# Patient Record
Sex: FEMALE | Race: WHITE | NOT HISPANIC OR LATINO | Employment: UNEMPLOYED | URBAN - METROPOLITAN AREA
[De-identification: names, ages, dates, MRNs, and addresses within clinical notes are randomized per-mention and may not be internally consistent; named-entity substitution may affect disease eponyms.]

---

## 2017-01-02 ENCOUNTER — HOSPITAL ENCOUNTER (EMERGENCY)
Facility: MEDICAL CENTER | Age: 27
End: 2017-01-02
Attending: EMERGENCY MEDICINE
Payer: COMMERCIAL

## 2017-01-02 VITALS
BODY MASS INDEX: 23.04 KG/M2 | OXYGEN SATURATION: 95 % | HEIGHT: 63 IN | TEMPERATURE: 97.3 F | HEART RATE: 70 BPM | DIASTOLIC BLOOD PRESSURE: 76 MMHG | SYSTOLIC BLOOD PRESSURE: 103 MMHG | RESPIRATION RATE: 16 BRPM | WEIGHT: 130 LBS

## 2017-01-02 DIAGNOSIS — F15.10 METHAMPHETAMINE ABUSE (HCC): ICD-10-CM

## 2017-01-02 LAB
ALBUMIN SERPL BCP-MCNC: 3.5 G/DL (ref 3.2–4.9)
ALBUMIN/GLOB SERPL: 1.3 G/DL
ALP SERPL-CCNC: 33 U/L (ref 30–99)
ALT SERPL-CCNC: 10 U/L (ref 2–50)
ANION GAP SERPL CALC-SCNC: 9 MMOL/L (ref 0–11.9)
AST SERPL-CCNC: 11 U/L (ref 12–45)
BASOPHILS # BLD AUTO: 0.5 % (ref 0–1.8)
BASOPHILS # BLD: 0.04 K/UL (ref 0–0.12)
BILIRUB SERPL-MCNC: 0.9 MG/DL (ref 0.1–1.5)
BUN SERPL-MCNC: 6 MG/DL (ref 8–22)
CALCIUM SERPL-MCNC: 8.9 MG/DL (ref 8.5–10.5)
CHLORIDE SERPL-SCNC: 107 MMOL/L (ref 96–112)
CO2 SERPL-SCNC: 21 MMOL/L (ref 20–33)
CREAT SERPL-MCNC: 0.69 MG/DL (ref 0.5–1.4)
EOSINOPHIL # BLD AUTO: 0.22 K/UL (ref 0–0.51)
EOSINOPHIL NFR BLD: 2.7 % (ref 0–6.9)
ERYTHROCYTE [DISTWIDTH] IN BLOOD BY AUTOMATED COUNT: 43.1 FL (ref 35.9–50)
GFR SERPL CREATININE-BSD FRML MDRD: >60 ML/MIN/1.73 M 2
GLOBULIN SER CALC-MCNC: 2.7 G/DL (ref 1.9–3.5)
GLUCOSE SERPL-MCNC: 75 MG/DL (ref 65–99)
HCT VFR BLD AUTO: 40.6 % (ref 37–47)
HGB BLD-MCNC: 12.9 G/DL (ref 12–16)
IMM GRANULOCYTES # BLD AUTO: 0.02 K/UL (ref 0–0.11)
IMM GRANULOCYTES NFR BLD AUTO: 0.2 % (ref 0–0.9)
LYMPHOCYTES # BLD AUTO: 1.84 K/UL (ref 1–4.8)
LYMPHOCYTES NFR BLD: 22.3 % (ref 22–41)
MCH RBC QN AUTO: 28.9 PG (ref 27–33)
MCHC RBC AUTO-ENTMCNC: 31.8 G/DL (ref 33.6–35)
MCV RBC AUTO: 91 FL (ref 81.4–97.8)
MONOCYTES # BLD AUTO: 0.44 K/UL (ref 0–0.85)
MONOCYTES NFR BLD AUTO: 5.3 % (ref 0–13.4)
NEUTROPHILS # BLD AUTO: 5.68 K/UL (ref 2–7.15)
NEUTROPHILS NFR BLD: 69 % (ref 44–72)
NRBC # BLD AUTO: 0 K/UL
NRBC BLD AUTO-RTO: 0 /100 WBC
PLATELET # BLD AUTO: 191 K/UL (ref 164–446)
PMV BLD AUTO: 9.8 FL (ref 9–12.9)
POTASSIUM SERPL-SCNC: 3.4 MMOL/L (ref 3.6–5.5)
PROT SERPL-MCNC: 6.2 G/DL (ref 6–8.2)
RBC # BLD AUTO: 4.46 M/UL (ref 4.2–5.4)
SODIUM SERPL-SCNC: 137 MMOL/L (ref 135–145)
TROPONIN I SERPL-MCNC: <0.01 NG/ML (ref 0–0.04)
WBC # BLD AUTO: 8.2 K/UL (ref 4.8–10.8)

## 2017-01-02 PROCEDURE — 36415 COLL VENOUS BLD VENIPUNCTURE: CPT

## 2017-01-02 PROCEDURE — 80053 COMPREHEN METABOLIC PANEL: CPT

## 2017-01-02 PROCEDURE — 99285 EMERGENCY DEPT VISIT HI MDM: CPT

## 2017-01-02 PROCEDURE — 84484 ASSAY OF TROPONIN QUANT: CPT

## 2017-01-02 PROCEDURE — 700111 HCHG RX REV CODE 636 W/ 250 OVERRIDE (IP): Performed by: EMERGENCY MEDICINE

## 2017-01-02 PROCEDURE — 96361 HYDRATE IV INFUSION ADD-ON: CPT

## 2017-01-02 PROCEDURE — 96374 THER/PROPH/DIAG INJ IV PUSH: CPT

## 2017-01-02 PROCEDURE — 85025 COMPLETE CBC W/AUTO DIFF WBC: CPT

## 2017-01-02 PROCEDURE — 700105 HCHG RX REV CODE 258: Performed by: EMERGENCY MEDICINE

## 2017-01-02 RX ORDER — LORAZEPAM 2 MG/ML
1 INJECTION INTRAMUSCULAR ONCE
Status: COMPLETED | OUTPATIENT
Start: 2017-01-02 | End: 2017-01-02

## 2017-01-02 RX ORDER — SODIUM CHLORIDE 9 MG/ML
1000 INJECTION, SOLUTION INTRAVENOUS ONCE
Status: COMPLETED | OUTPATIENT
Start: 2017-01-02 | End: 2017-01-02

## 2017-01-02 RX ADMIN — SODIUM CHLORIDE 1000 ML: 9 INJECTION, SOLUTION INTRAVENOUS at 20:14

## 2017-01-02 RX ADMIN — LORAZEPAM 1 MG: 2 INJECTION INTRAMUSCULAR; INTRAVENOUS at 20:14

## 2017-01-02 ASSESSMENT — PAIN SCALES - GENERAL: PAINLEVEL_OUTOF10: 6

## 2017-01-02 NOTE — ED AVS SNAPSHOT
After Visit Summary                                                                                                                Abel Angulo   MRN: [id number]    Department:  [facility], Emergency Dept   Date of Visit:  1/2/2017            [facility], Emergency Dept    [address] [place] [id number]    Phone:  [phone]      You were seen by     Marcus Hawkins M.D.      Your Diagnosis Was     Methamphetamine abuse     F15.10       These are the medications you received during your hospitalization from 01/02/2017 1933 to 01/02/2017 2312     Date/Time Order Dose Route Action    01/02/2017 2014 NS infusion 1,000 mL 1,000 mL Intravenous New Bag    01/02/2017 2014 lorazepam (ATIVAN) injection 1 mg 1 mg Intravenous Given      Follow-up Information     1. Follow up with [facility], Emergency Dept.    Specialty:  Emergency Medicine    Why:  If symptoms worsen    Contact information    [address] [place] [id number] [phone]      Medication Information     Review all of your home medications and newly ordered medications with your primary doctor and/or pharmacist as soon as possible. Follow medication instructions as directed by your doctor and/or pharmacist.     Please keep your complete medication list with you and share with your physician. Update the information when medications are discontinued, doses are changed, or new medications (including over-the-counter products) are added; and carry medication information at all times in the event of emergency situations.               Medication List      Notice     You have not been prescribed any medications.            Procedures and tests performed during your visit     CBC WITH DIFFERENTIAL    COMP METABOLIC PANEL    ESTIMATED GFR    IV Saline Lock    TROPONIN        Discharge Instructions       Amphetamine Abuse  Meth and other amphetamines over-stimulate the  nervous system. This gives you a false feeling of power and confidence. Amphetamines once came in the form of diet pills. This is no longer considered a valid reason to use the drug. More often they are bought as the illegal drug, methamphetamine. It is also known as crank, crystal, speed, or ice. Meth and similar drugs can cause a variety of problems. They can cause severe physical and psychological problems.  SYMPTOMS   · Reduced appetite.  · Dry mouth.  · Erectile dysfunction.  · Headache.  · Fever and sweating.  · Diarrhea or constipation.  · Blurred vision and impaired speech.  · Dizziness, uncontrollable movements or shaking.  · Restlessness.  · Palpitations and irregular heartbeat.  · Anxiety and/or general nervousness.  Long-term problems:  · Convulsions.  · Heart attack.  · Poor skin color.  · A mental state that mimics serious psychiatric illness.  · Emotional instability.  · Aggression.  · Dry or itchy skin.  · Acne.  RISKS AND COMPLICATIONS  Risks associated with needle use and inhaling include:  · Infection.  · Vein damage.  · Overdose.  · Skin abscesses.  · Hepatitis.  · AIDS.  TREATMENT   There are 2 types:   · Short-term medical treatment. This helps to preserve life and prevent or minimize damage from the problems described above.  · Long-term substance abuse treatment. This helps to achieve recovery from drug abuse or addiction.  HOME CARE INSTRUCTIONS   After treatment discharge, it is essential to do the following:  · Follow all instructions from your caregiver very carefully.  · Take all medications as prescribed. If you cannot, contact your caregiver right away.  · Keep all appointments for further evaluation and counseling.  · Do not use drugs, alcohol or any other mind and mood altering drugs unless prescribed by your doctor.  · Do not operate a motor vehicle or machinery until your caregiver says it is OK.  SEEK IMMEDIATE MEDICAL CARE IF:   · You have a seizure (convulsions).  · You become  very shaky or tense.  · You become light headed or faint.  · You notice sudden or gradual weakness on one side of the body or an arm or leg, or are unable to walk.  · You have a sudden, severe headache, blurred vision or high fever.  · You develop chest pain, nausea or vomiting.  If you have any of the above symptoms, DO NOT DRIVE. Have someone else drive you or call your local emergency services (911 in [place] for help.  Document Released: 01/25/2006 Document Revised: 03/11/2013 Document Reviewed: 03/15/2011  ExitCare® Patient Information ©2014 [employer].            Patient Information     Patient Information    Following emergency treatment: all patient requiring follow-up care must return either to a private physician or a clinic if your condition worsens before you are able to obtain further medical attention, please return to the emergency room.     Billing Information    At [facility], we work to make the billing process streamlined for our patients.  Our Representatives are here to answer any questions you may have regarding your hospital bill.  If you have insurance coverage and have supplied your insurance information to us, we will submit a claim to your insurer on your behalf.  Should you have any questions regarding your bill, we can be reached online or by phone as follows:  Online: You are able pay your bills online or live chat with our representatives about any billing questions you may have. We are here to help Monday - Friday from 8:00am to 7:30pm and 9:00am - 12:00pm on Saturdays.  Please visit [email or web address]  for more information.   Phone:  [phone] or [phone]    Please note that your emergency physician, surgeon, pathologist, radiologist, anesthesiologist, and other specialists are not employed by [facility] and will therefore bill separately for their services.  Please contact them directly for any questions concerning their bills at the  numbers below:     Emergency Physician Services:  [phone]  [facility]:  [phone]  [facility]:  [phone]  [facility]:  [phone]    1. Your final bill may vary from the amount quoted upon discharge if all procedures are not complete at that time, or if your doctor has additional procedures of which we are not aware. You will receive an additional bill if you return to the Emergency Department at [facility] for suture removal regardless of the facility of which the sutures were placed.     2. Please arrange for settlement of this account at the emergency registration.    3. All self-pay accounts are due in full at the time of treatment.  If you are unable to meet this obligation then payment is expected within 4-5 days.     4. If you have had radiology studies (CT, X-ray, Ultrasound, MRI), you have received a preliminary result during your emergency department visit. Please contact the radiology department [phone] to receive a copy of your final result. Please discuss the Final result with your primary physician or with the follow up physician provided.     Crisis Hotline:  [place] Crisis Hotline:  5-095-AYYUYJB or 1-368.897.1381  [place] Crisis Hotline:    1-472.968.2452 or 468-722-2000         ED Discharge Follow Up Questions    1. In order to provide you with very good care, we would like to follow up with a phone call in the next few days.  May we have your permission to contact you?     YES /  NO    2. What is the best phone number to call you? (       )_____-__________    3. What is the best time to call you?      Morning  /  Afternoon  /  Evening                   Patient Signature:  ____________________________________________________________    Date:  ____________________________________________________________

## 2017-01-02 NOTE — ED AVS SNAPSHOT
[employer] Access Code: [email or web address]  Expires: 2/1/2017 11:12 PM    [employer]  A secure, online tool to manage your health information     [employer]’s [employer]® is a secure, online tool that connects you to your personalized health information from the privacy of your home -- day or night - making it very easy for you to manage your healthcare. Once the activation process is completed, you can even access your medical information using the [employer] radha, which is available for free in the Apple Radha store or Google Play store.     [employer] provides the following levels of access (as shown below):   My Chart Features   [facility] Primary Care Doctor [facility]  Specialists [facility]  Urgent  Care Non-[facility]  Primary Care  Doctor   Email your healthcare team securely and privately 24/7 X X X X   Manage appointments: schedule your next appointment; view details of past/upcoming appointments X      Request prescription refills. X      View recent personal medical records, including lab and immunizations X X X X   View health record, including health history, allergies, medications X X X X   Read reports about your outpatient visits, procedures, consult and ER notes X X X X   See your discharge summary, which is a recap of your hospital and/or ER visit that includes your diagnosis, lab results, and care plan. X X       How to register for [employer]:  1. Go to  [email or web address].  2. Click on the Sign Up Now box, which takes you to the New Member Sign Up page. You will need to provide the following information:  a. Enter your [employer] Access Code exactly as it appears at the top of this page. (You will not need to use this code after you’ve completed the sign-up process. If you do not sign up before the expiration date, you must request a new code.)   b. Enter your date of birth.   c. Enter your home email address.   d. Click Submit, and follow the next screen’s instructions.  3. Create a [employer] ID. This will be your [employer]  login ID and cannot be changed, so think of one that is secure and easy to remember.  4. Create a [employer] password. You can change your password at any time.  5. Enter your Password Reset Question and Answer. This can be used at a later time if you forget your password.   6. Enter your e-mail address. This allows you to receive e-mail notifications when new information is available in [employer].  7. Click Sign Up. You can now view your health information.    For assistance activating your [employer] account, call [phone]

## 2017-01-02 NOTE — ED AVS SNAPSHOT
1/2/2017          Abel Rg Kev  [address]  [place] [place] [facility] [address]    Dear Abel:    [facility] wants to ensure your discharge home is safe and you or your loved ones have had all your questions answered regarding your care after you leave the hospital.    You may receive a telephone call within two days of your discharge.  This call is to make certain you understand your discharge instructions as well as ensure we provided you with the best care possible during your stay with us.     The call will only last approximately 3-5 minutes and will be done by a nurse.    Once again, we want to ensure your discharge home is safe and that you have a clear understanding of any next steps in your care.  If you have any questions or concerns, please do not hesitate to contact us, we are here for you.  Thank you for choosing [facility] for your healthcare needs.    Sincerely,    Truman Rich    [facility]

## 2017-01-03 NOTE — DISCHARGE PLANNING
Medical Social Work    Referral: Resources/Shelter    Intervention: MSW received a call from ERP requesting assistance with resources for pt's meth use and homelessness.  MSW met with pt at bedside.  Pt was provided with Alcohol and Drug Abuse resources and MSW recommended that she reach out to [facility] for assistance due to her insurance; pt reports understanding.  Pt states that she's homeless but knows where the shelter is.  Pt requested a bed at the shelter and MSW informed her that's unlikely due to the cold weather.  MSW contacted Laura with the [facility] who states that pt may stay in their day room at the tables for the night.  MSW updated bedside RN and pt.  Pt states that she will walk to the shelter from here.      Plan: Pt to D/C to the shelter.

## 2017-01-03 NOTE — ED PROVIDER NOTES
ER Provider Note     Scribed for Marcus Hawkins, * by Jeremy Lilly. 1/2/2017, 7:50 PM.    Primary Care Provider: SNEHA Lopez  Means of Arrival: Ambulance   History obtained from: Patient  History limited by: None     CHIEF COMPLAINT   Chief Complaint   Patient presents with   • Tremors     pt with jerking of arms and legs at [facility] today, co lightheadedness and dizziness prior to this, pt stops jerking movements to answer questions, then starts back, pt is aox4       HPI   Skymadisongiannialvin Ifrah Angulo is a 26 y.o. who presents to the emergency department by ambulance for tremors. Patient states she suddenly felt nauseated, dizzy, and lightheaded at [facility] earlier and had a bystander call EMS. Patient states vomiting prior to arrival. She reports to methamphetamine use the other day, and marijuana prior to arrival. Per patient, she has had congestion over past few days. Denies fever, chills, diarrhea or sore throat.      REVIEW OF SYSTEMS     General: Lightheadedness, dizzy. No fever or chills.  Eyes: No eye discharge. No eye pain.  Ear nose throat: No sore throat or  trouble swallowing. No runny nose or congestion.  Pulmonary: No shortness of breath or cough.  Cardiovascular: No chest pain or chest pressure.  GI: Nausea, vomiting. No abdominal pain   : No dysuria or hematuria  Dermatologic: No rashes. No abrasions.  Neurologic: No weakness or numbness.  Psychiatric: Positive stress for being homeless  All other systems reviewed and are negative    PAST MEDICAL HISTORY  Past Medical History   Diagnosis Date   • Dental disorder    • Migraine    • Psychiatric problem      depression   • Depression      anxiety   • Migraines    • Acid reflux        SURGICAL HISTORY  Past Surgical History   Procedure Laterality Date   • Other       left breast implant   • Esophagoscopy  4/5/2012     Performed by TULIO HOLLAND at SURGERY SAME DAY [facility]   • Gyn surgery       2 [facility]  "HISTORY  Social History   Substance Use Topics   • Smoking status: Current Some Day Smoker -- 0.00 packs/day for 2 years     Types: Cigarettes   • Smokeless tobacco: Never Used   • Alcohol Use: Yes       CURRENT MEDICATIONS  Previous Medications    No medications on file       ALLERGIES   Allergies   Allergen Reactions   • Benadryl Allergy Rash     Rash         PHYSICAL EXAM   Vital Signs: /76 mmHg  Pulse 86  Temp(Src) 36.3 °C (97.3 °F)  Resp 16  Ht 1.6 m (5' 3\")  Wt 58.968 kg (130 lb)  BMI 23.03 kg/m2  LMP 12/02/2016      Constitutional: Slightly disheveled, well appearing,  Psychiatric: Intermittent voluntary twitching.  HENT:  Oropharynx: no exudate no erythema  Eyes: PERRLA, EOMI, Conjunctiva normal, No discharge.   Musculoskeletal: Neck is soft and supple no meningismus  Lymphatic: No cervical lymphadenopathy noted.   Cardiovascular: Normal heart rate, Normal rhythm, No murmurs, Negative Homans, no pedal edema, good equal pedal pulses.  Pulmonary: Lungs are clear to auscultation bilaterally. No wheezes rales or rhonchi  Abdomen: Bowel sounds normal, soft nondistended and nontender. No rebound and no guarding .  Skin: Warm, Dry, No erythema, No rash.   : No CVA tenderness.   Neurologic: Alert & oriented, moves all extremities normally. Good sensation to light touch on all extremities.    DIAGNOSTIC STUDIES/PROCEDURES  Labs:   Results for orders placed or performed during the hospital encounter of 01/02/17   CBC WITH DIFFERENTIAL   Result Value Ref Range    WBC 8.2 4.8 - 10.8 K/uL    RBC 4.46 4.20 - 5.40 M/uL    Hemoglobin 12.9 12.0 - 16.0 g/dL    Hematocrit 40.6 37.0 - 47.0 %    MCV 91.0 81.4 - 97.8 fL    MCH 28.9 27.0 - 33.0 pg    MCHC 31.8 (L) 33.6 - 35.0 g/dL    RDW 43.1 35.9 - 50.0 fL    Platelet Count 191 164 - 446 K/uL    MPV 9.8 9.0 - 12.9 fL    Neutrophils-Polys 69.00 44.00 - 72.00 %    Lymphocytes 22.30 22.00 - 41.00 %    Monocytes 5.30 0.00 - 13.40 %    Eosinophils 2.70 0.00 - 6.90 %    " Basophils 0.50 0.00 - 1.80 %    Immature Granulocytes 0.20 0.00 - 0.90 %    Nucleated RBC 0.00 /100 WBC    Neutrophils (Absolute) 5.68 2.00 - 7.15 K/uL    Lymphs (Absolute) 1.84 1.00 - 4.80 K/uL    Monos (Absolute) 0.44 0.00 - 0.85 K/uL    Eos (Absolute) 0.22 0.00 - 0.51 K/uL    Baso (Absolute) 0.04 0.00 - 0.12 K/uL    Immature Granulocytes (abs) 0.02 0.00 - 0.11 K/uL    NRBC (Absolute) 0.00 K/uL   COMP METABOLIC PANEL   Result Value Ref Range    Sodium 137 135 - 145 mmol/L    Potassium 3.4 (L) 3.6 - 5.5 mmol/L    Chloride 107 96 - 112 mmol/L    Co2 21 20 - 33 mmol/L    Anion Gap 9.0 0.0 - 11.9    Glucose 75 65 - 99 mg/dL    Bun 6 (L) 8 - 22 mg/dL    Creatinine 0.69 0.50 - 1.40 mg/dL    Calcium 8.9 8.5 - 10.5 mg/dL    AST(SGOT) 11 (L) 12 - 45 U/L    ALT(SGPT) 10 2 - 50 U/L    Alkaline Phosphatase 33 30 - 99 U/L    Total Bilirubin 0.9 0.1 - 1.5 mg/dL    Albumin 3.5 3.2 - 4.9 g/dL    Total Protein 6.2 6.0 - 8.2 g/dL    Globulin 2.7 1.9 - 3.5 g/dL    A-G Ratio 1.3 g/dL   TROPONIN   Result Value Ref Range    Troponin I <0.01 0.00 - 0.04 ng/mL   ESTIMATED GFR   Result Value Ref Range    GFR If African American >60 >60 mL/min/1.73 m 2    GFR If Non African American >60 >60 mL/min/1.73 m 2     All labs reviewed by me.    COURSE & MEDICAL DECISION MAKING   Nursing notes, VS, PMSFSHx reviewed in chart     Reviewed previous medical records. Patient has multiple visits with similar complaints.     Differential Diagnoses: (include but are not limited to) methamphetamine use vs. Seizures vs URI.     7:50 PM - Patient was evaluated; estimated GFR, CBC with differential, CMP, Troponin ordered. The patient will be medicated with 1,000 mL NS, 1 mg Ativan injection for her symptoms.      10:47 PM - Recheck patient. Patient sleeping comfortably. Patient experienced slight tremor when woken. She was informed there is no evidence of abnormalities on lab results and will be discharged home. Patient states she is currently homeless, I  informed her she will consult with social work.    10:53 PM - Paged social work.     This patient is here to methamphetamine use. She appears well nontoxic no signs of any stroke or infectious etiology the patient be safely discharged home as per the shaking it's appears to be purposeful only when she wakes up and at this point is intermittent. The patient when given Ativan completely stopped was sleeping for several hours woke up and started having shaking as well. The patient has a seizure and there is 2nd malingering or 2nd issue here at this point the patient was found to be homeless at that time and therefore I suspect that this may be some secondary gain.    Patient will be discharged encouraged to follow-up with a primary care physician she also was given referral by social work.    The patient will return for new or worsening symptoms and is stable at the time of discharge.    DISPOSITION:  Patient will be discharged home in stable condition.    FOLLOW UP:  [facility], Emergency Dept  [address] [facility] [address] [phone]    If symptoms worsen      OUTPATIENT MEDICATIONS:  New Prescriptions    No medications on file     FINAL IMPRESSION   1. Methamphetamine abuse         Jeremy RUEDA (Remi), am scribing for, and in the presence of, Marcus Hawkins, *.    Electronically signed by: Jeremy Lilly (Remi), 1/2/2017    Marcus RUEDA, * personally performed the services described in this documentation, as scribed by Jeremy Lilly in my presence, and it is both accurate and complete.    The note accurately reflects work and decisions made by me.  Marcus Hawkins  1/3/2017  12:41 AM

## 2017-01-03 NOTE — ED NOTES
Pt having tremors to arms and legs until this nurse performed a venipuncture to lt hand, pt stopped having tremors to arms held arms still, legs continued to move occasionally, pt able to speak and follow commands during tremors, pt held guaze to venipuncture site until tape is placed then started having tremors to her arms again

## 2017-01-03 NOTE — ED NOTES
..  Chief Complaint   Patient presents with   • Tremors     pt with jerking of arms and legs at [facility] today, co lightheadedness and dizziness prior to this, pt stops jerking movements to answer questions, then starts back, pt is aox4

## 2017-01-03 NOTE — DISCHARGE INSTRUCTIONS
Amphetamine Abuse  Meth and other amphetamines over-stimulate the nervous system. This gives you a false feeling of power and confidence. Amphetamines once came in the form of diet pills. This is no longer considered a valid reason to use the drug. More often they are bought as the illegal drug, methamphetamine. It is also known as crank, crystal, speed, or ice. Meth and similar drugs can cause a variety of problems. They can cause severe physical and psychological problems.  SYMPTOMS   · Reduced appetite.  · Dry mouth.  · Erectile dysfunction.  · Headache.  · Fever and sweating.  · Diarrhea or constipation.  · Blurred vision and impaired speech.  · Dizziness, uncontrollable movements or shaking.  · Restlessness.  · Palpitations and irregular heartbeat.  · Anxiety and/or general nervousness.  Long-term problems:  · Convulsions.  · Heart attack.  · Poor skin color.  · A mental state that mimics serious psychiatric illness.  · Emotional instability.  · Aggression.  · Dry or itchy skin.  · Acne.  RISKS AND COMPLICATIONS  Risks associated with needle use and inhaling include:  · Infection.  · Vein damage.  · Overdose.  · Skin abscesses.  · Hepatitis.  · AIDS.  TREATMENT   There are 2 types:   · Short-term medical treatment. This helps to preserve life and prevent or minimize damage from the problems described above.  · Long-term substance abuse treatment. This helps to achieve recovery from drug abuse or addiction.  HOME CARE INSTRUCTIONS   After treatment discharge, it is essential to do the following:  · Follow all instructions from your caregiver very carefully.  · Take all medications as prescribed. If you cannot, contact your caregiver right away.  · Keep all appointments for further evaluation and counseling.  · Do not use drugs, alcohol or any other mind and mood altering drugs unless prescribed by your doctor.  · Do not operate a motor vehicle or machinery until your caregiver says it is OK.  SEEK IMMEDIATE  MEDICAL CARE IF:   · You have a seizure (convulsions).  · You become very shaky or tense.  · You become light headed or faint.  · You notice sudden or gradual weakness on one side of the body or an arm or leg, or are unable to walk.  · You have a sudden, severe headache, blurred vision or high fever.  · You develop chest pain, nausea or vomiting.  If you have any of the above symptoms, DO NOT DRIVE. Have someone else drive you or call your local emergency services (911 in [place] for help.  Document Released: 01/25/2006 Document Revised: 03/11/2013 Document Reviewed: 03/15/2011  [employer]® Patient Information ©2014 [employer].

## 2017-01-03 NOTE — ED NOTES
Pt resting in bed, eyes closed, resp are even and unlabored, no distress noted, no tremors during sleep noted

## 2017-01-03 NOTE — ED NOTES
..Pt discharged in stable condition, ambulatory to waiting room in stable condition with all belongings denies any questions on dc

## 2018-02-21 ENCOUNTER — HOSPITAL ENCOUNTER (EMERGENCY)
Facility: MEDICAL CENTER | Age: 28
End: 2018-02-21
Attending: EMERGENCY MEDICINE
Payer: COMMERCIAL

## 2018-02-21 VITALS
OXYGEN SATURATION: 100 % | TEMPERATURE: 97.6 F | DIASTOLIC BLOOD PRESSURE: 84 MMHG | HEIGHT: 63 IN | SYSTOLIC BLOOD PRESSURE: 119 MMHG | RESPIRATION RATE: 16 BRPM | BODY MASS INDEX: 26.99 KG/M2 | WEIGHT: 152.34 LBS | HEART RATE: 99 BPM

## 2018-02-21 DIAGNOSIS — L02.92 FURUNCULOSIS: ICD-10-CM

## 2018-02-21 PROCEDURE — 99283 EMERGENCY DEPT VISIT LOW MDM: CPT

## 2018-02-21 RX ORDER — DOXYCYCLINE 100 MG/1
100 CAPSULE ORAL 2 TIMES DAILY
Qty: 20 CAP | Refills: 0 | Status: SHIPPED | OUTPATIENT
Start: 2018-02-21 | End: 2018-02-21

## 2018-02-21 RX ORDER — DOXYCYCLINE 100 MG/1
100 CAPSULE ORAL 2 TIMES DAILY
Qty: 20 CAP | Refills: 0 | Status: SHIPPED | OUTPATIENT
Start: 2018-02-21 | End: 2019-03-28

## 2018-02-21 ASSESSMENT — PAIN SCALES - GENERAL
PAINLEVEL_OUTOF10: 7
PAINLEVEL_OUTOF10: 7

## 2018-02-21 NOTE — ED NOTES
Patient given discharge teaching and education. Verbalizes understanding. Given chance to ask questions, all questions answered. Educated patient of signs and symptoms to returned to ER, and educated patient they can return for any concerning symptoms. One prescription provided to patient. Education given to patient about medication. Patient states they have their belongings. Denies additional needs at this time. Reports pain is well controlled. Patient monitored every hour and PRN for safety and comfort. Patient ambulatory out of department.

## 2018-02-21 NOTE — DISCHARGE INSTRUCTIONS
Folliculitis  Folliculitis is redness, soreness, and swelling (inflammation) of the hair follicles. This condition can occur anywhere on the body. People with weakened immune systems, diabetes, or obesity have a greater risk of getting folliculitis.  CAUSES  · Bacterial infection. This is the most common cause.  · Fungal infection.  · Viral infection.  · Contact with certain chemicals, especially oils and tars.  Long-term folliculitis can result from bacteria that live in the nostrils. The bacteria may trigger multiple outbreaks of folliculitis over time.  SYMPTOMS  Folliculitis most commonly occurs on the scalp, thighs, legs, back, buttocks, and areas where hair is shaved frequently. An early sign of folliculitis is a small, white or yellow, pus-filled, itchy lesion (pustule). These lesions appear on a red, inflamed follicle. They are usually less than 0.2 inches (5 mm) wide. When there is an infection of the follicle that goes deeper, it becomes a boil or furuncle. A group of closely packed boils creates a larger lesion (carbuncle). Carbuncles tend to occur in hairy, sweaty areas of the body.  DIAGNOSIS   Your caregiver can usually tell what is wrong by doing a physical exam. A sample may be taken from one of the lesions and tested in a lab. This can help determine what is causing your folliculitis.  TREATMENT   Treatment may include:  · Applying warm compresses to the affected areas.  · Taking antibiotic medicines orally or applying them to the skin.  · Draining the lesions if they contain a large amount of pus or fluid.  · Laser hair removal for cases of long-lasting folliculitis. This helps to prevent regrowth of the hair.  HOME CARE INSTRUCTIONS  · Apply warm compresses to the affected areas as directed by your caregiver.  · If antibiotics are prescribed, take them as directed. Finish them even if you start to feel better.  · You may take over-the-counter medicines to relieve itching.  · Do not shave irritated  skin.  · Follow up with your caregiver as directed.  SEEK IMMEDIATE MEDICAL CARE IF:   · You have increasing redness, swelling, or pain in the affected area.  · You have a fever.  MAKE SURE YOU:  · Understand these instructions.  · Will watch your condition.  · Will get help right away if you are not doing well or get worse.     This information is not intended to replace advice given to you by your health care provider. Make sure you discuss any questions you have with your health care provider.     Document Released: 02/26/2003 Document Revised: 01/08/2016 Document Reviewed: 03/19/2013  [employer] Interactive Patient Education ©2016 [employer]

## 2018-02-21 NOTE — ED PROVIDER NOTES
"ED Provider Note    CHIEF COMPLAINT  Chief Complaint   Patient presents with   • Arm Pain       HPI  Abel Angulo is a 27 y.o. female who presents with tender swollen bumps underneath her right armpit. They come and go over the last month. She changed from using shaving cream to not using shaving cream. This seemed to resulted in increased bumps. She has not had a fever or chills. Mild discomfort.    REVIEW OF SYSTEMS  Pertinent negative: No fevers chills, swelling of the arm rash    PAST MEDICAL HISTORY  Past Medical History:   Diagnosis Date   • Acid reflux    • Dental disorder    • Depression     anxiety   • Migraine    • Migraines    • Psychiatric problem     depression       SOCIAL HISTORY  Social History   Substance Use Topics   • Smoking status: Current Some Day Smoker     Packs/day: 0.00     Years: 2.00     Types: Cigarettes   • Smokeless tobacco: Never Used   • Alcohol use Yes       SURGICAL HISTORY  Past Surgical History:   Procedure Laterality Date   • ESOPHAGOSCOPY  4/5/2012    Performed by TULIO HOLLAND at SURGERY SAME DAY [facility] ORS   • GYN SURGERY      2 csection   • OTHER      left breast implant       ALLERGIES  Allergies   Allergen Reactions   • Benadryl Allergy Rash     Rash         PHYSICAL EXAM  VITAL SIGNS: /76   Pulse 95   Temp 36.4 °C (97.6 °F) (Temporal)   Resp 16   Ht 1.6 m (5' 3\")   Wt 69.1 kg (152 lb 5.4 oz)   SpO2 100%   BMI 26.99 kg/m²    Constitutional: Awake and alert. Nontoxic  HENT:  Grossly normal  Eyes: Grossly normal  Neck: Normal range of motion  Cardiovascular: Normal heart rate   Thorax & Lungs: No respiratory distress  Abdomen: Nontender  Skin: 3 separate furuncles in the right axilla. No definite abscess. No overlying cellulitis  Extremities: Well perfused  Psychiatric: Affect normal    COURSE & MEDICAL DECISION MAKING  Patient presents with furunculosis. She will be treated with doxycycline. There is no indication for I and D at this time. I " discussed possible development of abscess. She will return if she has increased swelling, fever or concern advised that she follow up with primary provider within one week.    Patient referred to primary provider for blood pressure management    FINAL IMPRESSION  1. Furunculosis, right axillary      Disposition: home in good condition      This dictation was created using voice recognition software. The accuracy of the dictation is limited to the abilities of the software.  The nursing notes were reviewed and certain aspects of this information were incorporated into this note.      Electronically signed by: Tony Dent, 2/21/2018 2:47 PM

## 2018-03-01 ENCOUNTER — OFFICE VISIT (OUTPATIENT)
Dept: INTERNAL MEDICINE | Facility: MEDICAL CENTER | Age: 28
End: 2018-03-01
Payer: COMMERCIAL

## 2018-03-01 VITALS
HEIGHT: 64 IN | OXYGEN SATURATION: 96 % | DIASTOLIC BLOOD PRESSURE: 72 MMHG | TEMPERATURE: 97.8 F | BODY MASS INDEX: 27.31 KG/M2 | WEIGHT: 160 LBS | SYSTOLIC BLOOD PRESSURE: 107 MMHG | HEART RATE: 98 BPM

## 2018-03-01 DIAGNOSIS — L30.8 OTHER ECZEMA: ICD-10-CM

## 2018-03-01 DIAGNOSIS — Z76.89 ENCOUNTER TO ESTABLISH CARE: ICD-10-CM

## 2018-03-01 DIAGNOSIS — F19.91 HISTORY OF DRUG USE DISORDER: ICD-10-CM

## 2018-03-01 DIAGNOSIS — N89.8 VAGINAL ODOR: ICD-10-CM

## 2018-03-01 DIAGNOSIS — R05.9 COUGH: ICD-10-CM

## 2018-03-01 DIAGNOSIS — Z87.898 HISTORY OF ALCOHOL USE DISORDER: ICD-10-CM

## 2018-03-01 DIAGNOSIS — F31.9 BIPOLAR 1 DISORDER (HCC): ICD-10-CM

## 2018-03-01 DIAGNOSIS — Z72.51 UNPROTECTED SEX: ICD-10-CM

## 2018-03-01 DIAGNOSIS — F33.9 RECURRENT MAJOR DEPRESSIVE DISORDER, REMISSION STATUS UNSPECIFIED (HCC): ICD-10-CM

## 2018-03-01 DIAGNOSIS — Z13.228 SCREENING FOR METABOLIC DISORDER: ICD-10-CM

## 2018-03-01 DIAGNOSIS — Z13.29 SCREENING FOR ENDOCRINE DISORDER: ICD-10-CM

## 2018-03-01 DIAGNOSIS — Z98.82 BREAST IMPLANT STATUS: ICD-10-CM

## 2018-03-01 PROBLEM — F32.A DEPRESSION: Status: ACTIVE | Noted: 2018-03-01

## 2018-03-01 PROCEDURE — 99203 OFFICE O/P NEW LOW 30 MIN: CPT | Mod: GE | Performed by: INTERNAL MEDICINE

## 2018-03-01 RX ORDER — LITHIUM CARBONATE 600 MG/1
CAPSULE ORAL
COMMUNITY
End: 2018-03-08

## 2018-03-01 RX ORDER — TRAZODONE HYDROCHLORIDE 50 MG/1
50 TABLET ORAL NIGHTLY
COMMUNITY
End: 2019-03-28 | Stop reason: CLARIF

## 2018-03-01 RX ORDER — ALBUTEROL SULFATE 90 UG/1
2 AEROSOL, METERED RESPIRATORY (INHALATION) EVERY 6 HOURS PRN
Qty: 8.5 G | Refills: 1 | Status: SHIPPED | OUTPATIENT
Start: 2018-03-01 | End: 2019-03-28

## 2018-03-01 RX ORDER — RISPERIDONE 2 MG/1
2 TABLET ORAL 2 TIMES DAILY
COMMUNITY
End: 2019-03-28 | Stop reason: CLARIF

## 2018-03-01 RX ORDER — BENZONATATE 100 MG/1
100 CAPSULE ORAL 3 TIMES DAILY PRN
Qty: 60 CAP | Refills: 0 | Status: SHIPPED | OUTPATIENT
Start: 2018-03-01 | End: 2019-03-28

## 2018-03-01 RX ORDER — LAMOTRIGINE 150 MG/1
150 TABLET ORAL DAILY
COMMUNITY
End: 2019-03-28 | Stop reason: CLARIF

## 2018-03-01 ASSESSMENT — PATIENT HEALTH QUESTIONNAIRE - PHQ9
CLINICAL INTERPRETATION OF PHQ2 SCORE: 2
5. POOR APPETITE OR OVEREATING: 3 - NEARLY EVERY DAY
SUM OF ALL RESPONSES TO PHQ QUESTIONS 1-9: 19
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
## 2018-03-06 ENCOUNTER — HOSPITAL ENCOUNTER (OUTPATIENT)
Dept: LAB | Facility: MEDICAL CENTER | Age: 28
End: 2018-03-06
Attending: STUDENT IN AN ORGANIZED HEALTH CARE EDUCATION/TRAINING PROGRAM
Payer: COMMERCIAL

## 2018-03-06 ENCOUNTER — HOSPITAL ENCOUNTER (OUTPATIENT)
Dept: RADIOLOGY | Facility: MEDICAL CENTER | Age: 28
End: 2018-03-06
Attending: STUDENT IN AN ORGANIZED HEALTH CARE EDUCATION/TRAINING PROGRAM
Payer: COMMERCIAL

## 2018-03-06 DIAGNOSIS — Z72.51 UNPROTECTED SEX: ICD-10-CM

## 2018-03-06 DIAGNOSIS — Z13.29 SCREENING FOR ENDOCRINE DISORDER: ICD-10-CM

## 2018-03-06 DIAGNOSIS — Z87.898 HISTORY OF ALCOHOL USE DISORDER: ICD-10-CM

## 2018-03-06 DIAGNOSIS — R05.9 COUGH: ICD-10-CM

## 2018-03-06 DIAGNOSIS — F19.91 HISTORY OF DRUG USE DISORDER: ICD-10-CM

## 2018-03-06 DIAGNOSIS — Z13.228 SCREENING FOR METABOLIC DISORDER: ICD-10-CM

## 2018-03-06 LAB
ALBUMIN SERPL BCP-MCNC: 4.4 G/DL (ref 3.2–4.9)
ALBUMIN/GLOB SERPL: 1.3 G/DL
ALP SERPL-CCNC: 47 U/L (ref 30–99)
ALT SERPL-CCNC: 14 U/L (ref 2–50)
ANION GAP SERPL CALC-SCNC: 7 MMOL/L (ref 0–11.9)
AST SERPL-CCNC: 17 U/L (ref 12–45)
BASOPHILS # BLD AUTO: 1.1 % (ref 0–1.8)
BASOPHILS # BLD: 0.07 K/UL (ref 0–0.12)
BILIRUB SERPL-MCNC: 0.4 MG/DL (ref 0.1–1.5)
BUN SERPL-MCNC: 15 MG/DL (ref 8–22)
CALCIUM SERPL-MCNC: 10 MG/DL (ref 8.5–10.5)
CHLORIDE SERPL-SCNC: 101 MMOL/L (ref 96–112)
CHOLEST SERPL-MCNC: 160 MG/DL (ref 100–199)
CO2 SERPL-SCNC: 27 MMOL/L (ref 20–33)
CREAT SERPL-MCNC: 0.8 MG/DL (ref 0.5–1.4)
EOSINOPHIL # BLD AUTO: 0.21 K/UL (ref 0–0.51)
EOSINOPHIL NFR BLD: 3.2 % (ref 0–6.9)
ERYTHROCYTE [DISTWIDTH] IN BLOOD BY AUTOMATED COUNT: 46.6 FL (ref 35.9–50)
EST. AVERAGE GLUCOSE BLD GHB EST-MCNC: 100 MG/DL
GLOBULIN SER CALC-MCNC: 3.5 G/DL (ref 1.9–3.5)
GLUCOSE SERPL-MCNC: 76 MG/DL (ref 65–99)
HBA1C MFR BLD: 5.1 % (ref 0–5.6)
HBV SURFACE AB SERPL IA-ACNC: 92.29 MIU/ML (ref 0–10)
HCT VFR BLD AUTO: 42.5 % (ref 37–47)
HCV AB SER QL: NEGATIVE
HDLC SERPL-MCNC: 92 MG/DL
HGB BLD-MCNC: 13 G/DL (ref 12–16)
HIV 1+2 AB+HIV1 P24 AG SERPL QL IA: NON REACTIVE
IMM GRANULOCYTES # BLD AUTO: 0.02 K/UL (ref 0–0.11)
IMM GRANULOCYTES NFR BLD AUTO: 0.3 % (ref 0–0.9)
LDLC SERPL CALC-MCNC: 53 MG/DL
LYMPHOCYTES # BLD AUTO: 1.4 K/UL (ref 1–4.8)
LYMPHOCYTES NFR BLD: 21.1 % (ref 22–41)
MCH RBC QN AUTO: 28.3 PG (ref 27–33)
MCHC RBC AUTO-ENTMCNC: 30.6 G/DL (ref 33.6–35)
MCV RBC AUTO: 92.4 FL (ref 81.4–97.8)
MONOCYTES # BLD AUTO: 0.31 K/UL (ref 0–0.85)
MONOCYTES NFR BLD AUTO: 4.7 % (ref 0–13.4)
NEUTROPHILS # BLD AUTO: 4.61 K/UL (ref 2–7.15)
NEUTROPHILS NFR BLD: 69.6 % (ref 44–72)
NRBC # BLD AUTO: 0 K/UL
NRBC BLD-RTO: 0 /100 WBC
PLATELET # BLD AUTO: 236 K/UL (ref 164–446)
PMV BLD AUTO: 8.8 FL (ref 9–12.9)
POTASSIUM SERPL-SCNC: 4.1 MMOL/L (ref 3.6–5.5)
PROT SERPL-MCNC: 7.9 G/DL (ref 6–8.2)
RBC # BLD AUTO: 4.6 M/UL (ref 4.2–5.4)
SODIUM SERPL-SCNC: 135 MMOL/L (ref 135–145)
TRIGL SERPL-MCNC: 77 MG/DL (ref 0–149)
TSH SERPL DL<=0.005 MIU/L-ACNC: 3.14 UIU/ML (ref 0.38–5.33)
WBC # BLD AUTO: 6.6 K/UL (ref 4.8–10.8)

## 2018-03-06 PROCEDURE — 86592 SYPHILIS TEST NON-TREP QUAL: CPT

## 2018-03-06 PROCEDURE — 80053 COMPREHEN METABOLIC PANEL: CPT

## 2018-03-06 PROCEDURE — 80061 LIPID PANEL: CPT

## 2018-03-06 PROCEDURE — 87491 CHLMYD TRACH DNA AMP PROBE: CPT

## 2018-03-06 PROCEDURE — 87389 HIV-1 AG W/HIV-1&-2 AB AG IA: CPT

## 2018-03-06 PROCEDURE — 87591 N.GONORRHOEAE DNA AMP PROB: CPT

## 2018-03-06 PROCEDURE — 86780 TREPONEMA PALLIDUM: CPT

## 2018-03-06 PROCEDURE — 85025 COMPLETE CBC W/AUTO DIFF WBC: CPT

## 2018-03-06 PROCEDURE — 83036 HEMOGLOBIN GLYCOSYLATED A1C: CPT

## 2018-03-06 PROCEDURE — 86706 HEP B SURFACE ANTIBODY: CPT

## 2018-03-06 PROCEDURE — 71046 X-RAY EXAM CHEST 2 VIEWS: CPT

## 2018-03-06 PROCEDURE — 86593 SYPHILIS TEST NON-TREP QUANT: CPT

## 2018-03-06 PROCEDURE — 86803 HEPATITIS C AB TEST: CPT

## 2018-03-06 PROCEDURE — 36415 COLL VENOUS BLD VENIPUNCTURE: CPT

## 2018-03-06 PROCEDURE — 86708 HEPATITIS A ANTIBODY: CPT

## 2018-03-06 PROCEDURE — 84443 ASSAY THYROID STIM HORMONE: CPT

## 2018-03-07 LAB
C TRACH DNA SPEC QL NAA+PROBE: NEGATIVE
N GONORRHOEA DNA SPEC QL NAA+PROBE: NEGATIVE
SPECIMEN SOURCE: NORMAL

## 2018-03-08 ENCOUNTER — OFFICE VISIT (OUTPATIENT)
Dept: INTERNAL MEDICINE | Facility: MEDICAL CENTER | Age: 28
End: 2018-03-08
Payer: COMMERCIAL

## 2018-03-08 VITALS
HEART RATE: 86 BPM | DIASTOLIC BLOOD PRESSURE: 56 MMHG | TEMPERATURE: 97.9 F | WEIGHT: 164 LBS | BODY MASS INDEX: 28 KG/M2 | OXYGEN SATURATION: 97 % | HEIGHT: 64 IN | SYSTOLIC BLOOD PRESSURE: 96 MMHG

## 2018-03-08 DIAGNOSIS — R13.10 DYSPHAGIA, UNSPECIFIED TYPE: ICD-10-CM

## 2018-03-08 DIAGNOSIS — Z09 FOLLOW UP: ICD-10-CM

## 2018-03-08 DIAGNOSIS — J06.9 UPPER RESPIRATORY TRACT INFECTION, UNSPECIFIED TYPE: ICD-10-CM

## 2018-03-08 LAB
HAV AB SER QL IA: NEGATIVE
RPR SER QL: NON REACTIVE

## 2018-03-08 PROCEDURE — 99214 OFFICE O/P EST MOD 30 MIN: CPT | Mod: GC | Performed by: INTERNAL MEDICINE

## 2018-03-08 ASSESSMENT — PATIENT HEALTH QUESTIONNAIRE - PHQ9: CLINICAL INTERPRETATION OF PHQ2 SCORE: 0

## 2018-03-08 NOTE — PROGRESS NOTES
"      Established Patient    Abel presents today with the following:    CC: Dysphasia. Lab follow-up.    HPI: Patient is a 27-year-old female with history of bipolar disorder and depression as well as polysubstance abuse. She is currently sober and [facility]. She is here to follow-up on labs that were done at last visit for preventative healthcare and with new complaint of dysphagia and symptoms of upper respiratory infection.    Dysphasia  Patient states that she's had this problem on and off for most of her life. She states that she gets these symptoms intermittently and that she occasionally chokes on tablets, sometimes and solids, and sometimes liquids. Patient also states that in the past she was evaluated by ENT and that she had some sort of dilating procedure in her throat. She is not a good historian and does not recall when, where, or by whom this was done. Currently the patient reports dysphagia to solids and states that she almost tripped on a banana this morning. Patient denies food being stuck in her throat, coughing up or vomiting undigested or poorly digested food. Patient also reports a history of being told there was a \"bone spur\" in her throat.    URI  Patient with chronic cough and new symptoms of runny nose. States she caught a cold that's going around. Denies systemic symptoms.       Patient Active Problem List    Diagnosis Date Noted   • Depression 03/01/2018   • Bipolar 1 disorder (CMS-[facility]) 03/01/2018   • History of alcohol use disorder 03/01/2018   • History of drug use disorder 03/01/2018       Current Outpatient Prescriptions   Medication Sig Dispense Refill   • traZODone (DESYREL) 50 MG Tab Take 50 mg by mouth every evening.     • risperiDONE (RISPERDAL) 2 MG Tab Take 2 mg by mouth 2 times a day.     • lamotrigine (LAMICTAL) 150 MG tablet Take 150 mg by mouth every day.     • benzonatate (TESSALON) 100 MG Cap Take 1 Cap by mouth 3 times a day as " "needed for Cough. 60 Cap 0   • albuterol 108 (90 Base) MCG/ACT Aero Soln inhalation aerosol Inhale 2 Puffs by mouth every 6 hours as needed for Shortness of Breath. 8.5 g 1   • doxycycline (MONODOX) 100 MG capsule Take 1 Cap by mouth 2 times a day. 20 Cap 0     No current facility-administered medications for this visit.        ROS: As per HPI. Additional pertinent symptoms as noted below.    All others negative    BP (!) 96/56   Pulse 86   Temp 36.6 °C (97.9 °F)   Ht 1.613 m (5' 3.5\")   Wt 74.4 kg (164 lb)   SpO2 97%   BMI 28.60 kg/m²     Physical Exam   Constitutional:  oriented to person, place, and time. No distress.   Eyes: Pupils are equal, round, and reactive to light. No scleral icterus.  Ears: TM visualized, ear canal patent.  Neck: Neck supple. No thyromegaly present.   Cardiovascular: Normal rate, regular rhythm and normal heart sounds.  Exam reveals no gallop and no friction rub.  No murmur heard.  Pulmonary/Chest: Breath sounds normal. No adventitious sounds noted.  Musculoskeletal: no edema.   Lymphadenopathy: no cervical adenopathy  Neurological: alert and oriented to person, place, and time.   Skin: No cyanosis. Nails show no clubbing.    Note: I have reviewed all pertinent labs and diagnostic tests associated with this visit with specific comments listed under the assessment and plan below    Assessment and Plan    1. Dysphagia, unspecified type  Patient complains of intermittent dysphagia to solids and liquids. Stated that she's had this issue for most of her life and that she has been evaluated by ENT in the past without complete resolution of symptoms.  - barium swallow for further evaluation  - will consider a referral to GI vs speech depending on the results      2. URI  Patient with cough and rhinorrhea, but without systemic symptoms.   - Instructed patient in supportive measures - lots of fluids, tea with lemon, honey, ginger.   - Continue tessalon for cough.  - told the patient that " she can try another OTC cough syrup instead of tessalon.      3. Preventative health  Patient is now up to date with her preventative care screening.   - labs results discussed with the patient in detail  - Patient was given a referral to plastic surgery, but it did not result in an appointment as medicare doesn't cover their services. Patient was encouraged to contact medicare to see what options are available to her.      Followup: Return in about 6 months (around 9/8/2018). for routine follow up.      Signed by: Kristine Arreola M.D.

## 2018-04-01 ENCOUNTER — HOSPITAL ENCOUNTER (EMERGENCY)
Dept: HOSPITAL 8 - ED | Age: 28
Discharge: HOME | End: 2018-04-01
Payer: MEDICAID

## 2018-04-01 VITALS — HEIGHT: 63 IN | BODY MASS INDEX: 26.91 KG/M2 | WEIGHT: 151.9 LBS

## 2018-04-01 VITALS — DIASTOLIC BLOOD PRESSURE: 52 MMHG | SYSTOLIC BLOOD PRESSURE: 107 MMHG

## 2018-04-01 DIAGNOSIS — H10.31: ICD-10-CM

## 2018-04-01 DIAGNOSIS — G43.909: ICD-10-CM

## 2018-04-01 DIAGNOSIS — N30.00: Primary | ICD-10-CM

## 2018-04-01 DIAGNOSIS — A64: ICD-10-CM

## 2018-04-01 LAB
CULTURE INDICATED?: YES
HCG UR SG: 1.04 (ref 1–1.03)
MICROSCOPIC: (no result)

## 2018-04-01 PROCEDURE — 87086 URINE CULTURE/COLONY COUNT: CPT

## 2018-04-01 PROCEDURE — 96372 THER/PROPH/DIAG INJ SC/IM: CPT

## 2018-04-01 PROCEDURE — 81001 URINALYSIS AUTO W/SCOPE: CPT

## 2018-04-01 PROCEDURE — 81025 URINE PREGNANCY TEST: CPT

## 2018-04-01 PROCEDURE — 99284 EMERGENCY DEPT VISIT MOD MDM: CPT

## 2019-03-28 ENCOUNTER — HOSPITAL ENCOUNTER (EMERGENCY)
Facility: MEDICAL CENTER | Age: 29
End: 2019-04-03
Attending: EMERGENCY MEDICINE

## 2019-03-28 DIAGNOSIS — F15.10 METHAMPHETAMINE ABUSE (HCC): ICD-10-CM

## 2019-03-28 DIAGNOSIS — R45.851 SUICIDAL IDEATION: ICD-10-CM

## 2019-03-28 LAB
AMPHET UR QL SCN: POSITIVE
BARBITURATES UR QL SCN: NEGATIVE
BENZODIAZ UR QL SCN: NEGATIVE
BZE UR QL SCN: POSITIVE
CANNABINOIDS UR QL SCN: NEGATIVE
HCG UR QL: NEGATIVE
METHADONE UR QL SCN: NEGATIVE
OPIATES UR QL SCN: NEGATIVE
OXYCODONE UR QL SCN: NEGATIVE
PCP UR QL SCN: NEGATIVE
POC BREATHALIZER: 0 PERCENT (ref 0–0.01)
PROPOXYPH UR QL SCN: NEGATIVE
SP GR UR REFRACTOMETRY: 1.02

## 2019-03-28 PROCEDURE — 80307 DRUG TEST PRSMV CHEM ANLYZR: CPT

## 2019-03-28 PROCEDURE — 99285 EMERGENCY DEPT VISIT HI MDM: CPT

## 2019-03-28 PROCEDURE — 81025 URINE PREGNANCY TEST: CPT

## 2019-03-28 PROCEDURE — 302970 POC BREATHALIZER: Performed by: EMERGENCY MEDICINE

## 2019-03-28 PROCEDURE — 90791 PSYCH DIAGNOSTIC EVALUATION: CPT

## 2019-03-28 RX ORDER — QUETIAPINE FUMARATE 25 MG/1
25 TABLET, FILM COATED ORAL
COMMUNITY
End: 2019-03-28 | Stop reason: CLARIF

## 2019-03-28 RX ORDER — LITHIUM CARBONATE 300 MG
600 TABLET ORAL 2 TIMES DAILY
COMMUNITY
End: 2019-03-28 | Stop reason: CLARIF

## 2019-03-28 ASSESSMENT — LIFESTYLE VARIABLES
DO YOU DRINK ALCOHOL: NO
DO YOU DRINK ALCOHOL: NO

## 2019-03-28 NOTE — ED PROVIDER NOTES
ED Provider Note    CHIEF COMPLAINT  Chief Complaint   Patient presents with   • Suicidal Ideation     for one year, jump off of a bridge or cutting wrist   • Drug Abuse     Marijuana       HPI  Abel Ifrah Angulo is a 28 y.o. female with a history of bipolar disorder, depression, polysubstance abuse who presents to the ER with complaints of feeling depressed and suicidal.  Patient says she has a long history of depression, and has been feeling suicidal.  She has thought about jumping off a bridge among other ideas.  She just returned to the the area several days ago, and was living with somebody that she thought was a friend.   She states she has been off her medications for the past 3 or 4 months after she lost them while in [place].  The patient says she has been hearing voices.  She does admit to smoking methamphetamine, last time about 1 or 2 days ago.  She denies any marijuana use or any alcohol use.  She denies fever, sore throat, cough, congestion, difficulty breathing.  She has had no vomiting or diarrhea.    REVIEW OF SYSTEMS  See HPI for further details. All other systems are negative.     PAST MEDICAL HISTORY  Past Medical History:   Diagnosis Date   • Acid reflux    • Bipolar 1 disorder (HCC)    • Dental disorder    • Depression     anxiety   • Migraine    • Migraines    • Psychiatric problem     depression       FAMILY HISTORY  Family History   Problem Relation Age of Onset   • Family history unknown: Yes       SOCIAL HISTORY  Social History     Social History   • Marital status: Single     Spouse name: N/A   • Number of children: N/A   • Years of education: N/A     Social History Main Topics   • Smoking status: Current Some Day Smoker     Packs/day: 0.50     Years: 2.00     Types: Cigarettes   • Smokeless tobacco: Never Used   • Alcohol use No      Comment: quit October 2 2013   • Drug use: Yes     Types: Inhaled      Comment: Marijuana   • Sexual activity: No     Other Topics Concern   • Not  "on file     Social History Narrative   • No narrative on file       SURGICAL HISTORY  Past Surgical History:   Procedure Laterality Date   • ESOPHAGOSCOPY  4/5/2012    Performed by TULIO HOLLAND at SURGERY SAME DAY [facility] ORS   • GYN SURGERY      2 csection   • OTHER      left breast implant       CURRENT MEDICATIONS  Home Medications     Reviewed by Frankie Mata R.N. (Registered Nurse) on 03/28/19 at 1552  Med List Status: Partial   Medication Last Dose Status   lamotrigine (LAMICTAL) 150 MG tablet  Active   lithium (ESKALITH) 300 MG Tab  Active   QUEtiapine (SEROQUEL) 25 MG Tab  Active   risperiDONE (RISPERDAL) 2 MG Tab  Active   traZODone (DESYREL) 50 MG Tab  Active                ALLERGIES  Allergies   Allergen Reactions   • Benadryl Allergy Rash     Rash     • Diphenhydramine      Other reaction(s): Rash, Unspecified       PHYSICAL EXAM  VITAL SIGNS: /84   Pulse 84   Temp 36.6 °C (97.8 °F) (Oral)   Resp 20   Ht 1.6 m (5' 3\")   Wt 72.6 kg (160 lb)   BMI 28.34 kg/m²   Constitutional: Awake, alert, in no acute distress, Non-toxic appearance, eating a boxed lunch.  Occasional lip smacking.  HENT: Atraumatic. Bilateral external ears normal, mucous membranes moist, throat nonerythematous without exudates, nose is normal.  There are several scabs on her face and lower lip.  Eyes: PERRL, EOMI, conjunctiva moist, noninjected.  Neck: Nontender, Normal range of motion, No nuchal rigidity, No stridor.   Lymphatic: No lymphadenopathy noted.   Cardiovascular: Regular rate and rhythm, no murmurs, rubs, gallops.  Thorax & Lungs:  Good breath sounds bilaterally, no wheezes, rales, or retractions.  No chest tenderness.  Abdomen: Bowel sounds normal, Soft, nontender, nondistended, no rebound, guarding, masses.  Back: No CVA or spinal tenderness.  Extremities: Intact distal pulses, No edema, No tenderness.   Skin: Warm, Dry, No rashes.   Musculoskeletal: No joint swelling or tenderness.  Neurologic: Alert & " oriented x 3, cranial nerves II through XII intact, sensory and motor function normal. No focal deficits.   Psychiatric: She is calm, cooperative, admits to hearing voices, states she feels depressed and suicidal.      COURSE & MEDICAL DECISION MAKING  Pertinent Labs & Imaging studies reviewed. (See chart for details)  The patient presents with the above complaints.  She does have a history of methamphetamine abuse and just recently used 1 or 2 days ago.  She does complain of feeling depressed and suicidal all her life.  The patient has no acute medical condition requiring admission to hospital.  A [employer] consult was obtained.  Patient was placed on a legal hold.  Patient will be transferred to a mental health facility when accepted.    FINAL IMPRESSION  1.  Depression with suicidal ideation   2.  Methamphetamine abuse  3.         Electronically signed by: Michael Membreno, 3/28/2019 3:56 PM

## 2019-03-28 NOTE — ED NOTES
Chief Complaint   Patient presents with   • Suicidal Ideation     for one year, jump off of a bridge or cutting wrist   • Drug Abuse     Marijuana   Patient arrived via EMS, gait steady upon arrival, no longer taking psychiatric medication, suicidal thoughts with plan to jump off of bridge or cut wrist, anxious, denies all drug use except marijuana, states that she picks at skin during withdrawal but denies current meth use.

## 2019-03-28 NOTE — ED TRIAGE NOTES
Patient arrived via EMS, suicidal ideations with plan to jump off of bridge or cut wrist, denies substance use except for marijuana, suicidal thoughts for one year, mid back pain

## 2019-03-28 NOTE — ED NOTES
Patient resting in bed, awake alert and oriented x 4, Glascow 15, bed in low position, box lunch provided per patient's request, unlabored breathing noted, no cough noted, frequent rounding performed, anxious but interactions are appropriate and discusses topic of conversation easily.

## 2019-03-29 LAB
ALBUMIN SERPL BCP-MCNC: 3.9 G/DL (ref 3.2–4.9)
ALBUMIN/GLOB SERPL: 1.3 G/DL
ALP SERPL-CCNC: 39 U/L (ref 30–99)
ALT SERPL-CCNC: 14 U/L (ref 2–50)
ANION GAP SERPL CALC-SCNC: 9 MMOL/L (ref 0–11.9)
AST SERPL-CCNC: 13 U/L (ref 12–45)
BASOPHILS # BLD AUTO: 0.8 % (ref 0–1.8)
BASOPHILS # BLD: 0.04 K/UL (ref 0–0.12)
BILIRUB SERPL-MCNC: 0.5 MG/DL (ref 0.1–1.5)
BUN SERPL-MCNC: 11 MG/DL (ref 8–22)
CALCIUM SERPL-MCNC: 8.9 MG/DL (ref 8.5–10.5)
CHLORIDE SERPL-SCNC: 104 MMOL/L (ref 96–112)
CO2 SERPL-SCNC: 29 MMOL/L (ref 20–33)
CREAT SERPL-MCNC: 0.89 MG/DL (ref 0.5–1.4)
EOSINOPHIL # BLD AUTO: 0.08 K/UL (ref 0–0.51)
EOSINOPHIL NFR BLD: 1.6 % (ref 0–6.9)
ERYTHROCYTE [DISTWIDTH] IN BLOOD BY AUTOMATED COUNT: 43.3 FL (ref 35.9–50)
GLOBULIN SER CALC-MCNC: 3.1 G/DL (ref 1.9–3.5)
GLUCOSE SERPL-MCNC: 85 MG/DL (ref 65–99)
HCT VFR BLD AUTO: 44.5 % (ref 37–47)
HGB BLD-MCNC: 14 G/DL (ref 12–16)
IMM GRANULOCYTES # BLD AUTO: 0.01 K/UL (ref 0–0.11)
IMM GRANULOCYTES NFR BLD AUTO: 0.2 % (ref 0–0.9)
LYMPHOCYTES # BLD AUTO: 2 K/UL (ref 1–4.8)
LYMPHOCYTES NFR BLD: 40 % (ref 22–41)
MCH RBC QN AUTO: 29.9 PG (ref 27–33)
MCHC RBC AUTO-ENTMCNC: 31.5 G/DL (ref 33.6–35)
MCV RBC AUTO: 94.9 FL (ref 81.4–97.8)
MONOCYTES # BLD AUTO: 0.32 K/UL (ref 0–0.85)
MONOCYTES NFR BLD AUTO: 6.4 % (ref 0–13.4)
NEUTROPHILS # BLD AUTO: 2.55 K/UL (ref 2–7.15)
NEUTROPHILS NFR BLD: 51 % (ref 44–72)
NRBC # BLD AUTO: 0 K/UL
NRBC BLD-RTO: 0 /100 WBC
PLATELET # BLD AUTO: 175 K/UL (ref 164–446)
PMV BLD AUTO: 9.7 FL (ref 9–12.9)
POTASSIUM SERPL-SCNC: 4.2 MMOL/L (ref 3.6–5.5)
PROT SERPL-MCNC: 7 G/DL (ref 6–8.2)
RBC # BLD AUTO: 4.69 M/UL (ref 4.2–5.4)
SODIUM SERPL-SCNC: 142 MMOL/L (ref 135–145)
WBC # BLD AUTO: 5 K/UL (ref 4.8–10.8)

## 2019-03-29 PROCEDURE — 700101 HCHG RX REV CODE 250: Performed by: EMERGENCY MEDICINE

## 2019-03-29 PROCEDURE — 700102 HCHG RX REV CODE 250 W/ 637 OVERRIDE(OP): Performed by: PSYCHIATRY & NEUROLOGY

## 2019-03-29 PROCEDURE — 85025 COMPLETE CBC W/AUTO DIFF WBC: CPT

## 2019-03-29 PROCEDURE — 80053 COMPREHEN METABOLIC PANEL: CPT

## 2019-03-29 PROCEDURE — A9270 NON-COVERED ITEM OR SERVICE: HCPCS | Performed by: PSYCHIATRY & NEUROLOGY

## 2019-03-29 PROCEDURE — 99284 EMERGENCY DEPT VISIT MOD MDM: CPT | Performed by: PSYCHIATRY & NEUROLOGY

## 2019-03-29 RX ORDER — RISPERIDONE 1 MG/1
1 TABLET ORAL 2 TIMES DAILY
Status: DISCONTINUED | OUTPATIENT
Start: 2019-03-29 | End: 2019-04-03 | Stop reason: HOSPADM

## 2019-03-29 RX ORDER — LITHIUM CARBONATE 300 MG/1
300 TABLET, FILM COATED, EXTENDED RELEASE ORAL EVERY 12 HOURS
Status: DISCONTINUED | OUTPATIENT
Start: 2019-03-29 | End: 2019-04-01

## 2019-03-29 RX ORDER — BACITRACIN ZINC AND POLYMYXIN B SULFATE 500; 10000 [USP'U]/G; [USP'U]/G
OINTMENT OPHTHALMIC 2 TIMES DAILY
Status: DISCONTINUED | OUTPATIENT
Start: 2019-03-29 | End: 2019-04-03 | Stop reason: HOSPADM

## 2019-03-29 RX ADMIN — LITHIUM CARBONATE 300 MG: 300 TABLET, EXTENDED RELEASE ORAL at 18:07

## 2019-03-29 RX ADMIN — BACITRACIN ZINC AND POLYMYXIN B SULFATES: 500; 10000 OINTMENT OPHTHALMIC at 18:06

## 2019-03-29 RX ADMIN — BACITRACIN ZINC AND POLYMYXIN B SULFATES: 500; 10000 OINTMENT OPHTHALMIC at 11:18

## 2019-03-29 RX ADMIN — RISPERIDONE 1 MG: 1 TABLET, FILM COATED ORAL at 18:07

## 2019-03-29 ASSESSMENT — PAIN SCALES - WONG BAKER: WONGBAKER_NUMERICALRESPONSE: HURTS EVEN MORE

## 2019-03-29 NOTE — ED NOTES
Med Rec Updated and Complete per Pt at bedside  Allergies Reviewed  No PO ABX last 30 days    Pt reports she has been out of her Lamictal, Lithium, and Seroquel for almost a year and that she has not been taking any medications since.    Pt also denies any OTC medications.

## 2019-03-29 NOTE — ED PROVIDER NOTES
ED Provider Note    Patient CARE signed out from nighttime ERP.  Patient sitting up, she complains of redness and drainage from her right eye.  Clinically, she does appear to have a right-sided conjunctivitis and have advised initiating antibiotic ointment for this.  She also questions wearing, her old medications will be restarted including lithium, Seroquel and Lamictal.  Per review of pharmacy reconciliation, it sounds like she has not been on these medications for a year or more.  Will await further evaluation by psychiatry before restarting.

## 2019-03-29 NOTE — ED NOTES
Patient's home medication secured in medication bag, patient signed bag, delivered to satellite pharmacy.

## 2019-03-29 NOTE — DISCHARGE PLANNING
ALERT team  note:  28 year old female admitted 3/28/19, legal hold, SI; no insurance plan; pt drowsy, oriented x 4; sleeping throughout the AM; restless, anxious when awake; denies current SI, HI, or self-harm ideation; [place] [facility], Dr. Kyle, legal hold extended; Lithium 300 mg PO BID and Risperdal 1 mg PO BID ordered; alteration in mood, depressed, resolving; [facility]

## 2019-03-29 NOTE — ED NOTES
Pt resting in room. No distress. Pt has no concerns or requests at this time. Respirations equal and unlabored. 1:1 sitter at bedside providing constant supervision.

## 2019-03-29 NOTE — PSYCHIATRY
"PSYCHIATRIC CONSULTATION:  Reason for admission:   Suicidal ideation   Reason for consult: suicidal ideation   Requesting Physician: Russell     Legal status:  On hold     Chief Complaint: \"I can't function\"     HPI:   Abel Angulo is a 28 y.o. year old female with a PMH of bipolar disorder, methamphetamine abuse,  who presented to [facility] complaining of suicidal ideation with a plan to jump off a bridge or cut her wrist. Reports she has been hearing voices. She's been off medications x 3-4 months. She was in [place] and just returned recently. Has AH telling her to steal things. Went to [employer] and they called 911 due to her disorganization. Was taking lamictal, lithium, and seroquel for about a year but stopped them. R sided conjuctivitis and was started on medication for this. She reports she has been feeling paranoid and when someone talks to her \"they say one things and I hear another\". She is very distractable. 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 She is rocking back and forth in the bed at times. She is unable to give me much information about her si, stating it's been the same x 1 year, but cant' tell me why she isn't functioning now but wa spreviously. She did use meth recently, it is likely due to that, she is disorganized.       Review of Systems:  Psychiatric:  Depression:      Depressed mood. Low energy. Anhedonia   Sharita: pressured speech. Tangential.   Anxiety/Panic Attacks anxious   PTSD symptom: not assessed  Psychosis:paranoid. +ah.   Other:  Neurologic:  Movement disorder.   Eyes:  Redness, has conjunctivitis   Skin:  Appears older than stated age. No rash or itching   Musculoskeletal:  +psychomotor agitation   CV:  No cp   Lungs:  No sob no cough   GI:  No n/v. appetitie good.   :  No dysuria   Endocrine:  No issues   All other systems reviewed and are negative.        Psychiatric Examination: observed phenomenon:  Vitals: /71   Pulse 74   Temp 36.6 °C " "(97.9 °F) (Oral)   Resp 13   Ht 1.6 m (5' 3\")   Wt 72.6 kg (160 lb)   SpO2 95%   BMI 28.34 kg/m²  Body mass index is 28.34 kg/m².      Appearance: appears older than stated age   Muscle Strength/Tone: involunary movements. Strength intact   Gait/Station:denies feeling wobblly   Speech: hard to understand. Rambling.   Thought Process:tangential, disorganized   Associations:some looseness   Abnormal/Psychotic Thoughts (ex): +ah, with command hallucinations. +ideas of reference   Insight/Judgement:fair   Orientation:oriented   Memory:impaired   Attention/Concentration: poor concentration   Language:fluent   Fund of Knowledge: not assessed  Mood:          Anxious   Affect:         Mildly labile   SI/HI:   +si with plan   Neurological Testing:( ie clock, cube drawing, MMSE, MOCA,etc.)       Past Psychiatric Hx:   2018 tried to drown self in bathtub in [place]  Was also hospitalized in 2017 for suicid attempt   Was at [facility] in 2018.   Has hx with mental health court.     Family Psychiatric Hx:  Unknown     Social Hx:  Social History     Social History   • Marital status: Single     Spouse name: N/A   • Number of children: N/A   • Years of education: N/A     Occupational History   • Not on file.     Social History Main Topics   • Smoking status: Current Some Day Smoker     Packs/day: 0.50     Years: 2.00     Types: Cigarettes   • Smokeless tobacco: Never Used   • Alcohol use No      Comment: quit October 2 2013   • Drug use: Yes     Types: Inhaled      Comment: Marijuana   • Sexual activity: No     Other Topics Concern   • Not on file     Social History Narrative   • No narrative on file   Has been in mental health court.   Just became homeless.       Drug/Alcohol/Tobacco Hx:   Drugs: history of meth use, marijuana. Was positive for both on this admission.    Alcohol: quit    Tobacco: +    Medical Hx: labs, MARS, medications, etc were reviewed. Only those findings of potential interest to psychiatry are noted " below:    Past Medical History:   Diagnosis Date   • Acid reflux    • Bipolar 1 disorder (HCC)    • Dental disorder    • Depression     anxiety   • Migraine    • Migraines    • Psychiatric problem     depression     Past Surgical History:   Procedure Laterality Date   • ESOPHAGOSCOPY  4/5/2012    Performed by TULIO HOLLAND at SURGERY SAME DAY [facility] ORS   • GYN SURGERY      2 csection   • OTHER      left breast implant       Allergies:   Allergies   Allergen Reactions   • Benadryl Allergy Rash     Rash     • Diphenhydramine      Other reaction(s): Rash, Unspecified       Medications:  Current Facility-Administered Medications   Medication Dose Route Frequency Provider Last Rate Last Dose   • bacitracin-polymyxin b (POLYSPORIN) [id number] UNIT/GM ophthalmic ointment   Right Eye BID Annabel Sparrow D.O.         No current outpatient prescriptions on file.       Labs/ Testing:  Recent Results (from the past 48 hour(s))   POC BREATHALIZER    Collection Time: 03/28/19  4:09 PM   Result Value Ref Range    POC Breathalizer 0.00 0.00 - 0.01 Percent   Urine Drug Screen    Collection Time: 03/28/19  5:27 PM   Result Value Ref Range    Amphetamines Urine Positive (A) Negative    Barbiturates Negative Negative    Benzodiazepines Negative Negative    Cocaine Metabolite Positive (A) Negative    Methadone Negative Negative    Opiates Negative Negative    Oxycodone Negative Negative    Phencyclidine -Pcp Negative Negative    Propoxyphene Negative Negative    Cannabinoid Metab Negative Negative   HCG QUALITATIVE UR    Collection Time: 03/28/19  5:27 PM   Result Value Ref Range    Beta-Hcg Urine Negative Negative   REFRACTOMETER SG    Collection Time: 03/28/19  5:27 PM   Result Value Ref Range    Specific Gravity 1.025        No orders to display       ASSESSMENT:   Bipolar disorder, current episode mixed  Methamphetamine use disorder     PLAN:  Legal status: extended    Starting lithium 300mg po bid   Checking cbc, cmp      Starting risperdal 1mg po bid for psychosis   eligible for transfer to [facility] 6:yes  Thank you for the consult.

## 2019-03-29 NOTE — ED NOTES
Patient resting in bed, sleeping, no signs of pain or distress, unlabored breathing noted, one on one observation in place, bed in low position, safety room features in place, repositions self occasionally, responds to surrounding stimuli.

## 2019-03-29 NOTE — ED NOTES
Pt resp even and non labored, bed in lowest position, sitter in place.  Pt has no complaints at this time  Break rn

## 2019-03-29 NOTE — ED NOTES
Pt sleeping. No distress. Pt has no concerns or requests at this time. Respirations equal and unlabored. 1:1 sitter at bedside providing constant supervision.

## 2019-03-29 NOTE — ED NOTES
Patient's home medications have been reviewed by the pharmacy team.     Past Medical History:   Diagnosis Date   • Acid reflux    • Bipolar 1 disorder (HCC)    • Dental disorder    • Depression     anxiety   • Migraine    • Migraines    • Psychiatric problem     depression       Patient's Medications   New Prescriptions    No medications on file   Previous Medications    No medications on file   Modified Medications    No medications on file   Discontinued Medications    ALBUTEROL 108 (90 BASE) MCG/ACT AERO SOLN INHALATION AEROSOL    Inhale 2 Puffs by mouth every 6 hours as needed for Shortness of Breath.    BENZONATATE (TESSALON) 100 MG CAP    Take 1 Cap by mouth 3 times a day as needed for Cough.    DOXYCYCLINE (MONODOX) 100 MG CAPSULE    Take 1 Cap by mouth 2 times a day.    LAMOTRIGINE (LAMICTAL) 150 MG TABLET    Take 150 mg by mouth every day.    LITHIUM (ESKALITH) 300 MG TAB    Take 600 mg by mouth 2 Times a Day.    QUETIAPINE (SEROQUEL) 25 MG TAB    Take 25 mg by mouth every bedtime.    RISPERIDONE (RISPERDAL) 2 MG TAB    Take 2 mg by mouth 2 times a day.    TRAZODONE (DESYREL) 50 MG TAB    Take 50 mg by mouth every evening.        C/o of SI    A:  Medications do not appear to be contributing to current complaints. Patient states they have been out of the medications previously on our list for ~1year.       P:    No recommendations at this time. No home medications to reorder.      Alivia Chacon, PharmD., BCPS

## 2019-03-29 NOTE — ED NOTES
Pt sleeping. Pt has no concerns or requests at this time. Respirations equal and unlabored. 1:1 sitter at bedside providing constant supervision.

## 2019-03-29 NOTE — ED NOTES
Pt resting in room. NAD. Pt drinking juice. darvin well. Respirations equal and unlabored. 1:1 sitter at bedside providing constant supervision.

## 2019-03-29 NOTE — CONSULTS
"[facility]   TRIAGE ASSESSMENT    Name: Abel Angulo  MRN: [id number]  : 1990  Age: 28 y.o.  Date of assessment: 3/28/2019  PCP: No primary care provider on file.  Persons in attendance: Patient    CHIEF COMPLAINT/PRESENTING ISSUE (as stated by patient and pt chart): 28 year old female admitted today BIB EMS, legal hold, SI; pt alert, oriented to self, place, events; disoriented to date; pt anxious, but cooperative; disheveled; disorganized thoughts; decreased judgement and insight; states h/o A/H, \"mumbled, telling me to steal things\"; states she went to [facility] and they called 911 d/t pt verbalizing SI, to jump off of a bridge or cut wrists; with h/o chronic  SI \"for years\" and h/o SA, 2018, tried to drown self in the bathtub; with h/o inpt MH/CD treatment 2018 at [facility] (SI), 2017 at [facility] (SI), and 2018 at [facility] (mehtamphetamine dependence);  Denies current outpt MH/CD serives but with h/o outpt MH at [facility], 2018;  H/o psych meds include Lithium 600 mg PO BID, Seroquel 25 mg PO daily, and Lamictal 1 PO PRN, last doses 2018, RX \"thrown away', prescribed while pt inpt at [facility]; psych diagnoses include Depression, Bipolar D/O, Methamphetamine abuse; denies h/o aggression; with h/o arrests/nursing home, 2015,  x 3, positive UDS,  and , possession of drugs; pt with h/o of mental health court 2018, states she \"graduated\"; current substance use includes methamphetamines, smoking every other day, last use 3/27/19; states sober x 9 months and last NA mtg 2018; pt homeless x 1 days, states she left her boyfriend's residence today, unable to state why other than \"I don't belong there, I ain't going back\"; no source of income, does not receive disability; only positive support sytem is her grandmother in [place], [place]; positive coping skills include listening to music, going for a walk, riding bikes  Chief Complaint " "  Patient presents with   • Suicidal Ideation     for one year, jump off of a bridge or cutting wrist   • Drug Abuse     Marijuana        CURRENT LIVING SITUATION/SOCIAL SUPPORT:  pt homeless x 1 days, states she left her boyfriend's residence today, unable to state why other than \"I don't belong there, I ain't going back\";only positive support yonas is her grandmother in [place], [place];; h/o [facility] outpt services    BEHAVIORAL HEALTH TREATMENT HISTORY  Does patient/parent report a history of prior behavioral health treatment for patient?   Yes:    Dates Level of Care Facilty/Provider Diagnosis/Problem Medications   2018 inpt [facility] SI, Depression, Bipolar D/O  Lithium 600 mg PO BID, Seroquel 25 mg PO daily, and Lamictal 1 PO PRN, last doses 12/2018 2018 Sober living [facility] x 30 days Methamphetamine dependence    2017 inpt [facility] SI, Depression, Bipolar D/O    2018 outpt [facility] Depression, Bipolar D/O          SAFETY ASSESSMENT - SELF  Does patient acknowledge current or past symptoms of dangerousness to self? Yes-today  SI, to jump off of a bridge or cut wrists; with h/o chronic  SI \"for years\";  h/o SA, 2018, tried to drown self in the bathtub  Does parent/significant other report patient has current or past symptoms of dangerousness to self? N\A  Does presenting problem suggest symptoms of dangerousness to self? Yes:     Past Current    Suicidal Thoughts: [x]  [x]    Suicidal Plans: [x]  [x]    Suicidal Intent: []  []    Suicide Attempts: [x]  []    Self-Injury []  []      For any boxes checked above, provide detail: today  SI, to jump off of a bridge or cut wrists; with h/o chronic  SI \"for years\"'  h/o SA, 2018, tried to drown self in the bathtub      History of suicide by family member: no  History of suicide by friend/significant other: no  Recent change in frequency/specificity/intensity of suicidal thoughts or self-harm behavior? Yes, today  Current access to " "firearms, medications, or other identified means of suicide/self-harm? no  If yes, willing to restrict access to means of suicide/self-harm? yes - no guns or weapons  Protective factors present:  Hopefulness, Positive coping skills and Willing to address in treatment    SAFETY ASSESSMENT - OTHERS  Does patient acknowledge current or past symptoms of aggressive behavior or risk to others? no  Does parent/significant other report patient has current or past symptoms of aggressive behavior or risk to others?  N\A  Does presenting problem suggest symptoms of dangerousness to others? No    Crisis Safety Plan completed and copy given to patient? no    ABUSE/NEGLECT SCREENING  Does patient report feeling “unsafe” in his/her home, or afraid of anyone?  no  Does patient report any history of physical, sexual, or emotional abuse?  no  Does parent or significant other report any of the above? no and N\A  Is there evidence of neglect by self?  yes  Is there evidence of neglect by a caregiver? no  Does the patient/parent report any history of CPS/APS/police involvement related to suspected abuse/neglect or domestic violence? no  Based on the information provided during the current assessment, is a mandated report of suspected abuse/neglect being made?  No    SUBSTANCE USE SCREENING  Yes:  Car all substances used in the past 30 days:      Last Use Amount   []   Alcohol     []   Marijuana     []   Heroin     []   Prescription Opioids  (used without prescription, for    recreation, or in excess of prescribed amount)     []   Other Prescription  (used without prescription, for    recreation, or in excess of prescribed amount)     []   Cocaine      [x]   Methamphetamine 3/27/19 Smoking every other day   []   \"\" drugs (ectasy, MDMA)     []   Other substances        UDS results: collected, pending results  Breathalyzer results: negative    What consequences does the patient associate with any of the above substance use and or " addictive behaviors? Relationship problems:, Health problems: , Monetary problems:     Risk factors for detox (check all that apply):  []  Seizures   []  Diaphoretic (sweating)   []  Tremors   [x]  Hallucinations   []  Increased blood pressure   []  Decreased blood pressure   []  Other   []  None      [] Patient education on risk factors for detoxification and instructed to return to ER as needed.      MENTAL STATUS   Participation: Limited verbal participation and Open to feedback  Grooming: Disheveled  Orientation: Alert, Drowsy/Somnolent and Disoriented to: date  Behavior: anxious  Eye contact: Limited  Mood: Anxious  Affect: Blunted and Flat  Thought process: Goal-directed, Circumstantial and Tangential  Thought content: Preoccupation  Speech: Volume within normal limits and slurred speach  Perception: Evidence of auditory hallucination  Memory:  No gross evidence of memory deficits  Insight: Limited  Judgment:  Limited  Other:    Collateral information:   Source:  [] Significant other present in person:   [] Significant other by telephone  [] [facility]   [x] [facility] Nursing [facility]  [x] [facility]  [] Other:     [] Unable to complete full assessment due to:  [] Acute intoxication  [] Patient declined to participate/engage  [] Patient verbally unresponsive  [] Significant cognitive deficits  [] Significant perceptual distortions or behavioral disorganization  [] Other:      CLINICAL IMPRESSIONS:  Primary:  Depressed mood r/o  secondary to methamphetamine use  Secondary:  Amphetamine use disorder       IDENTIFIED NEEDS/PLAN:  [Trigger DISPOSITION list for any items marked]    [x]  Imminent safety risk - self [] Imminent safety risk - others   []  Acute substance withdrawal []  Psychosis/Impaired reality testing   [x]  Mood/anxiety []  Substance use/Addictive behavior   [x]  Maladaptive behaviro []  Parent/child conflict   []  Family/Couples conflict []  Biomedical   []  Housing [x]  Financial    []   Legal  Occupational/Educational   []  Domestic violence []  Other:     Disposition: Actively being addressed by Legal Hold and [facility] ; [facility]; no insurance plan    Does patient express agreement with the above plan? yes    Referral appointment(s) scheduled? no    Alert team only:   I have discussed findings and recommendations with Dr. Membreno who is in agreement with these recommendations.     Referral information sent to the following community providers :NA    If applicable : Referred  to :[facility] 3/28/19* for legal hold follow up at (time): 1700      Magda Adler R.N.  3/28/2019

## 2019-03-29 NOTE — ED NOTES
Pt provided with ice pack for right eye pain . darvin well. Strings cut from ice pack bag prior to giving to pt. 1:1 sitter remains at bedside for constant supervision

## 2019-03-29 NOTE — ED NOTES
"Patient complaining of right eye pain but states she has no other symptoms, states she is doing okay considering the situation, when asked if she is still  Having thoughts of self harm she states, \"I feel like I don't want to live anymore\" Patient also endorses auditory hallucinations   "

## 2019-03-29 NOTE — ED NOTES
Patient awake alert and oriented x 4, Glascow 15, bed in low position, speaking with mental health professional, bed in low position, unlabored breathing noted, agitation appears to have decreased, completed box lunch, no cough noted, eye contract good.

## 2019-03-29 NOTE — DISCHARGE PLANNING
Medical Social Work    Referral: Legal Hold    Intervention: Legal Hold Paperwork given to SW by [employer] RN Magda    Legal Hold Initiated: Date: 3/28/19 Time: 1730     Patient’s Insurance Listed on Face Sheet: None    Referrals sent to: [facility]    This referral contains the following information:  1) Face sheet __x__  2) Page 1 and Page 2 of Legal Hold __x__  3) Alert Team Assessment/Psych Assessment __x__  4) Head to toe physical exam __x__  5) Urine Drug Screen __x__  6) Blood Alcohol __x__  7) Vital signs _x___  8) Pregnancy test when applicable _x__  9) Medications list _x___    Plan: Patient will transfer to mental health facility once acceptance is obtained

## 2019-03-30 PROCEDURE — A9270 NON-COVERED ITEM OR SERVICE: HCPCS | Performed by: PSYCHIATRY & NEUROLOGY

## 2019-03-30 PROCEDURE — 700102 HCHG RX REV CODE 250 W/ 637 OVERRIDE(OP): Performed by: PSYCHIATRY & NEUROLOGY

## 2019-03-30 RX ADMIN — RISPERIDONE 1 MG: 1 TABLET, FILM COATED ORAL at 18:23

## 2019-03-30 RX ADMIN — LITHIUM CARBONATE 300 MG: 300 TABLET, EXTENDED RELEASE ORAL at 06:37

## 2019-03-30 RX ADMIN — RISPERIDONE 1 MG: 1 TABLET, FILM COATED ORAL at 06:37

## 2019-03-30 RX ADMIN — BACITRACIN ZINC AND POLYMYXIN B SULFATES: 500; 10000 OINTMENT OPHTHALMIC at 18:23

## 2019-03-30 RX ADMIN — LITHIUM CARBONATE 300 MG: 300 TABLET, EXTENDED RELEASE ORAL at 18:23

## 2019-03-30 RX ADMIN — BACITRACIN ZINC AND POLYMYXIN B SULFATES: 500; 10000 OINTMENT OPHTHALMIC at 06:38

## 2019-03-30 NOTE — ED NOTES
Hourly rounding performed. Assessed patient complaints and Bathroom/comfort needs.  Patient sleeping. Sitter at bedside.

## 2019-03-30 NOTE — ED NOTES
Hourly rounding performed. Assessed patient complaints and Bathroom/comfort needs.  Introduced self to patient, meal given. Sitter at bedside.

## 2019-03-30 NOTE — DISCHARGE PLANNING
"Alert Team    Patient lying in bed resting upon entering the room. Patient presents as alert, oriented, and aware of the legal hold process. Patient denies current SI, HI, or self-harm intent at this time. Patient reports \"I feel tired, just really exhausted and still a little anxious\" but otherwise notes she is not in distress. Patient awaiting transfer to inpatient psychiatric facility.  "

## 2019-03-30 NOTE — ED PROVIDER NOTES
"ED Provider Note    Addendum note: Addendum note: This is a 28 y.o. female currently on a legal hold with suicidal ideation, signed out to me pending transfer to an inpatient psychiatric facility. Under my care, this patient has been stable, requiring nothing.  At the conclusion of my shift, the patient will be signed out to the oncoming physician pending transfer to the inpatient psychiatric facility  /70   Pulse 60   Temp 36.6 °C (97.9 °F) (Oral)   Resp 16   Ht 1.6 m (5' 3\")   Wt 72.6 kg (160 lb)   SpO2 99%   BMI 28.34 kg/m²       "

## 2019-03-30 NOTE — ED NOTES
Pt resting in bed, breathing equal/unlabored. No distress noted. Direct observation from sitter. Will continue to monitor.

## 2019-03-30 NOTE — ED NOTES
Hourly rounding performed. Assessed patient complaints and Bathroom/comfort needs.  Patient resting, no complaints. Sitter at bedside

## 2019-03-31 PROCEDURE — A9270 NON-COVERED ITEM OR SERVICE: HCPCS | Performed by: PSYCHIATRY & NEUROLOGY

## 2019-03-31 PROCEDURE — 700102 HCHG RX REV CODE 250 W/ 637 OVERRIDE(OP): Performed by: PSYCHIATRY & NEUROLOGY

## 2019-03-31 RX ADMIN — BACITRACIN ZINC AND POLYMYXIN B SULFATES: 500; 10000 OINTMENT OPHTHALMIC at 06:20

## 2019-03-31 RX ADMIN — LITHIUM CARBONATE 300 MG: 300 TABLET, EXTENDED RELEASE ORAL at 17:53

## 2019-03-31 RX ADMIN — RISPERIDONE 1 MG: 1 TABLET, FILM COATED ORAL at 06:20

## 2019-03-31 RX ADMIN — BACITRACIN ZINC AND POLYMYXIN B SULFATES: 500; 10000 OINTMENT OPHTHALMIC at 17:53

## 2019-03-31 RX ADMIN — LITHIUM CARBONATE 300 MG: 300 TABLET, EXTENDED RELEASE ORAL at 06:20

## 2019-03-31 RX ADMIN — RISPERIDONE 1 MG: 1 TABLET, FILM COATED ORAL at 17:53

## 2019-03-31 NOTE — ED NOTES
Patient given lunch box. Patient calm and cooperative. A&O x4. Sitter outside room for direct obs. NAD. VSS.

## 2019-03-31 NOTE — ED PROVIDER NOTES
Vitals:    03/31/19 0609   BP: 106/64   Pulse: 64   Resp: 16   Temp: 36.5 °C (97.7 °F)   SpO2:        Patient awaiting psychiatric care at psychiatric facility.  Here for suicidal ideation.  Patient is been stable throughout her stay in the emergency department.

## 2019-03-31 NOTE — ED NOTES
Patient given her morning medications. Patient calm and cooperative. NAD. VSS. Sitter outside room for direct obs.

## 2019-04-01 PROCEDURE — A9270 NON-COVERED ITEM OR SERVICE: HCPCS | Performed by: PSYCHIATRY & NEUROLOGY

## 2019-04-01 PROCEDURE — 700102 HCHG RX REV CODE 250 W/ 637 OVERRIDE(OP): Performed by: PSYCHIATRY & NEUROLOGY

## 2019-04-01 PROCEDURE — A9270 NON-COVERED ITEM OR SERVICE: HCPCS | Performed by: EMERGENCY MEDICINE

## 2019-04-01 PROCEDURE — 700102 HCHG RX REV CODE 250 W/ 637 OVERRIDE(OP): Performed by: EMERGENCY MEDICINE

## 2019-04-01 PROCEDURE — 700101 HCHG RX REV CODE 250: Performed by: EMERGENCY MEDICINE

## 2019-04-01 RX ORDER — LITHIUM CARBONATE 300 MG
300 TABLET ORAL 2 TIMES DAILY
Status: DISCONTINUED | OUTPATIENT
Start: 2019-04-01 | End: 2019-04-02

## 2019-04-01 RX ADMIN — LITHIUM CARBONATE 300 MG: 300 TABLET ORAL at 18:04

## 2019-04-01 RX ADMIN — BACITRACIN ZINC AND POLYMYXIN B SULFATES: 500; 10000 OINTMENT OPHTHALMIC at 06:33

## 2019-04-01 RX ADMIN — RISPERIDONE 1 MG: 1 TABLET, FILM COATED ORAL at 06:33

## 2019-04-01 RX ADMIN — RISPERIDONE 1 MG: 1 TABLET, FILM COATED ORAL at 17:23

## 2019-04-01 RX ADMIN — BACITRACIN ZINC AND POLYMYXIN B SULFATES: 500; 10000 OINTMENT OPHTHALMIC at 17:23

## 2019-04-01 NOTE — ED PROVIDER NOTES
ED Provider Note Addendum    Scribed for Noe Whitt M.D. by Alan Myers on 4/1/2019 at 9:33 AM.     This is an addendum to the note on Abel Angulo.  For further details and full chart information, see patient's initial note.       6:32 AM - I discussed the patient's case with Dr. Boyle ([facility]) who will transfer care of the patient to me at this time.        11:00 AM  - The patient has done well during my period of observation. They are resting comfortably. They continue to await transfer to an inpatient psychiatric facility.     Disposition:  Patient will be transferred to an appropriate psychiatric facility in stable condition for further psychiatric care and evaluation.  Patient will be placed under the care of my partner awaiting transfer.    FINAL IMPRESSION  1. Methamphetamine abuse (HCC)    2. Suicidal ideation         I, Alan Myers (Scribe), am scribing for, and in the presence of, Noe Whitt M.D..    Electronically signed by: Alan Myers (Scribe), 4/1/2019    INoe M.D. personally performed the services described in this documentation, as scribed by Alan Myers in my presence, and it is both accurate and complete.    The note accurately reflects work and decisions made by me.  Noe Whitt  4/1/2019  11:21 AM

## 2019-04-01 NOTE — ED NOTES
Amb to nurse's station to make phone call.  Cooperative and calm.  Returns to room on own volition.

## 2019-04-01 NOTE — DISCHARGE PLANNING
"Alert Team    Patient lying in bed resting upon entering the room. The patient continues to endorse SI at this time, stating \"I feel like I'm starting to get better, but I'm still majorly depressed and glad to be safe, I don't know what would happen if I wasn't here\" Explained the legal hold process, which the patient acknowledged. The patient expressed no other concerns at this time; pt to remain on legal hold while awaiting placement.   "

## 2019-04-01 NOTE — DISCHARGE PLANNING
Awake, groggy, states she does not remember how she got here.  States she is having thoughts of not wanting to live because she has no one in her life. States she has had thoughts of drowning. Continue to monitor for safety and wait for placement in a psychiatric facility.

## 2019-04-02 LAB — LITHIUM SERPL-MCNC: 0.3 MMOL/L (ref 0.6–1.2)

## 2019-04-02 PROCEDURE — 99282 EMERGENCY DEPT VISIT SF MDM: CPT | Performed by: PSYCHIATRY & NEUROLOGY

## 2019-04-02 PROCEDURE — A9270 NON-COVERED ITEM OR SERVICE: HCPCS | Performed by: PSYCHIATRY & NEUROLOGY

## 2019-04-02 PROCEDURE — 80178 ASSAY OF LITHIUM: CPT

## 2019-04-02 PROCEDURE — A9270 NON-COVERED ITEM OR SERVICE: HCPCS | Performed by: EMERGENCY MEDICINE

## 2019-04-02 PROCEDURE — 700102 HCHG RX REV CODE 250 W/ 637 OVERRIDE(OP): Performed by: PSYCHIATRY & NEUROLOGY

## 2019-04-02 PROCEDURE — 700102 HCHG RX REV CODE 250 W/ 637 OVERRIDE(OP): Performed by: EMERGENCY MEDICINE

## 2019-04-02 RX ORDER — LITHIUM CARBONATE 300 MG/1
600 TABLET, FILM COATED, EXTENDED RELEASE ORAL
Status: DISCONTINUED | OUTPATIENT
Start: 2019-04-02 | End: 2019-04-03 | Stop reason: HOSPADM

## 2019-04-02 RX ORDER — LITHIUM CARBONATE 300 MG/1
300 TABLET, FILM COATED, EXTENDED RELEASE ORAL EVERY MORNING
Status: DISCONTINUED | OUTPATIENT
Start: 2019-04-03 | End: 2019-04-03 | Stop reason: HOSPADM

## 2019-04-02 RX ADMIN — LITHIUM CARBONATE 600 MG: 300 TABLET, EXTENDED RELEASE ORAL at 21:00

## 2019-04-02 RX ADMIN — LITHIUM CARBONATE 300 MG: 300 TABLET ORAL at 06:00

## 2019-04-02 RX ADMIN — BACITRACIN ZINC AND POLYMYXIN B SULFATES: 500; 10000 OINTMENT OPHTHALMIC at 06:34

## 2019-04-02 RX ADMIN — BACITRACIN ZINC AND POLYMYXIN B SULFATES: 500; 10000 OINTMENT OPHTHALMIC at 18:25

## 2019-04-02 RX ADMIN — RISPERIDONE 1 MG: 1 TABLET, FILM COATED ORAL at 18:25

## 2019-04-02 RX ADMIN — RISPERIDONE 1 MG: 1 TABLET, FILM COATED ORAL at 06:33

## 2019-04-02 NOTE — ED NOTES
Assumed pt care at this time.  Pt resting in bed supine at this time.  No needs verbalized.  Will continue to monitor.  Sitter present.

## 2019-04-02 NOTE — DISCHARGE PLANNING
Legal Hold     Referral: Legal Hold Court     Intervention: Pt presented for legal hold meeting with  to discuss legal options of contesting hold and meeting with the court doctors tomorrow afternoon, or not contesting legal hold and continuing the hold for one week.  advised that pt's legal hold will be be continued for one week (4/11/19).       Plan: Pt's legal hold has been continued for one week. Pt awaiting transfer to an in patient psych facility.

## 2019-04-02 NOTE — ED NOTES
Pt remains resting in bed right side lying at this time.  No needs verbalized.  Will continue to monitor.  Sitter present.

## 2019-04-02 NOTE — ED PROVIDER NOTES
ED Provider Note      Patient is reassessed at 1600.  Stable pending transfer.  No complaints.      Electronically signed by: Jasson Dailey, 4/2/2019 4:13 PM

## 2019-04-02 NOTE — ED NOTES
Pt remains resting in bed supine at this time.  No needs verbalized.  Will continue to monitor.  Sitter present.

## 2019-04-02 NOTE — ED NOTES
Pt remains resting in bed at this time.  No needs verbalized.  Will continue to monitor.  Sitter present.

## 2019-04-02 NOTE — ED NOTES
Patient currently awake and resting on gurney. Patient in direct view of nursing station and staff at all times.

## 2019-04-02 NOTE — ED NOTES
Report from CHRISTA Gómez. Assumed care.   Patient currently sitting calmly in [facility]. Room in direct view of nursing station and staff at all times.

## 2019-04-02 NOTE — PSYCHIATRY
"PSYCHIATRIC FOLLOW-UP:(established)  *Reason for admission:presents to the ER with complaints of feeling depressed and suicidal.  Patient says she has a long history of depression, and has been feeling suicidal.  She has thought about jumping off a bridge among other ideas.  She just returned to the the area several days ago, and was living with somebody that she thought was a friend.   She states she has been off her medications for the past 3 or 4 months after she lost them while in [place].  The patient says she has been hearing voices.  She does admit to smoking methamphetamine, last time about 1 or 2 days ago.        *Legal Hold Status on Admission:  +                  *HPI:   She is sleeping on and off and feels very tired.  Says seroquel has helped her sleep in the past. She is otherwise not having any side effects to meds. She is not hearing voices. Still feeling depressed and says she is a \"7\" (10 the worst) but she is not SI. However she seems to smile more easily.    She notes that sometimes she has trouble swallowing the lithium pill (and occasionally others) citing a \"bone spur\" somewhere in the side of her neck, internally.     She continues to feel people are after her, does not know why, but in general feels safe here. No AVH.       It is not clear if she has a place to stay or not. Had been living with BF and his family. She has not spoken with them and does not know if she can return.      Medical ROS (as pertinent):                    Labs: lithium level 0.3 (subtherapuetic) on 600 mg daily.    *Psychiatric Examination:  Vitals: Blood pressure 118/69, pulse 71, temperature 36.3 °C (97.4 °F), temperature source Temporal, resp. rate 16, height 1.6 m (5' 3\"), weight 72.6 kg (160 lb), SpO2 95 %.   General Appearance: good eye contact, a lot of yawning. Cooperative.   Abnormal Movements: none noted  Gait and Posture: none noted  Speech: wnl  Thought processes:normal rate  Associations: " "linear  Abnormal or Psychotic Thoughts: none  Judgement and Insight: fair  Orientation: grossly intact  Recent and Remote Memory: grossly intact  Attention Span and Concentration: intact  Language: fluid  Fund of Knowledge:not tested  Mood and Affect: depressed \"7\"/able to smile spontaneously at times.  SI/HI:denies      *ASSESSMENT/PLAN:  1. Bipolar disorder I now depressed without psychosis  -increase lithium     -seroquel for sleep       2. Methamphetamine use disorder     *Legal hold: extended            *Will Follow      "

## 2019-04-02 NOTE — ED NOTES
Pt sleeping comfortably in bed, breathing is easy and unlabored. Sitter at bedside. Will continue to monitor.

## 2019-04-02 NOTE — ED NOTES
Patient currently sleeping. NAD observed. Chest rise visible. Patient in direct view of nursing station and staff at all times.

## 2019-04-02 NOTE — ED NOTES
Pt states unable to swallow lithium tablets, requests liquid or capsule.  Called Rx for in house formulary.

## 2019-04-02 NOTE — ED NOTES
Pt asking if she could leave at this time, if she had reliable housing.  Pt educated on hold process at this time.  Will continue to monitor.

## 2019-04-02 NOTE — ED NOTES
Patient awake, calm and cooperative. Patient ambulatory to toilet with steady gait. Juice and crackers provided on request. Patient in view of nursing station and staff at all times.

## 2019-04-03 VITALS
SYSTOLIC BLOOD PRESSURE: 118 MMHG | HEART RATE: 104 BPM | TEMPERATURE: 98.5 F | OXYGEN SATURATION: 100 % | WEIGHT: 160 LBS | HEIGHT: 63 IN | DIASTOLIC BLOOD PRESSURE: 82 MMHG | BODY MASS INDEX: 28.35 KG/M2 | RESPIRATION RATE: 16 BRPM

## 2019-04-03 PROCEDURE — 700102 HCHG RX REV CODE 250 W/ 637 OVERRIDE(OP): Performed by: PSYCHIATRY & NEUROLOGY

## 2019-04-03 PROCEDURE — 99282 EMERGENCY DEPT VISIT SF MDM: CPT | Performed by: PSYCHIATRY & NEUROLOGY

## 2019-04-03 PROCEDURE — A9270 NON-COVERED ITEM OR SERVICE: HCPCS | Performed by: PSYCHIATRY & NEUROLOGY

## 2019-04-03 RX ORDER — LITHIUM CITRATE TETRAHYDRATE 100 %
POWDER (GRAM) MISCELLANEOUS
Qty: 90 BOTTLE | Refills: 0 | Status: SHIPPED | OUTPATIENT
Start: 2019-04-03 | End: 2019-05-03

## 2019-04-03 RX ORDER — RISPERIDONE 1 MG/1
1 TABLET ORAL 2 TIMES DAILY
Qty: 60 TAB | Refills: 0 | Status: SHIPPED | OUTPATIENT
Start: 2019-04-03 | End: 2022-07-08 | Stop reason: SDUPTHER

## 2019-04-03 RX ORDER — QUETIAPINE FUMARATE 50 MG/1
TABLET, FILM COATED ORAL
Qty: 30 TAB | Refills: 0 | Status: SHIPPED | OUTPATIENT
Start: 2019-04-03 | End: 2022-10-25

## 2019-04-03 RX ADMIN — BACITRACIN ZINC AND POLYMYXIN B SULFATES: 500; 10000 OINTMENT OPHTHALMIC at 06:40

## 2019-04-03 RX ADMIN — RISPERIDONE 1 MG: 1 TABLET, FILM COATED ORAL at 06:40

## 2019-04-03 RX ADMIN — LITHIUM CARBONATE 300 MG: 300 TABLET, EXTENDED RELEASE ORAL at 06:40

## 2019-04-03 NOTE — DISCHARGE PLANNING
Alert Team  Provided pt with resource lists for psych/counseling, chemical dependency and homelessness.  Advised pt to go to Welfare office asap and apply for [place] Medicaid.

## 2019-04-03 NOTE — ED NOTES
Patient given carol morning medications and her vitals taken. Patient calm and cooperative. NAD. VSS. A&O x4. Sitter outside room for direct observation.

## 2019-04-03 NOTE — ED NOTES
Patient ambulatory to the restroom with sitter at her side. Patient in no apparent distress, VSS. Patient returned to room and sitter outside room.

## 2019-04-03 NOTE — ED NOTES
Patient walking around her room but is calm and cooperative. Patient is A&O x4. Patient in no apparent distress, VSS. Sitter outside room.

## 2019-04-03 NOTE — PSYCHIATRY
"PSYCHIATRIC FOLLOW-UP:(established)  *Reason for admission: presents to the ER with complaints of feeling depressed and suicidal.  Patient says she has a long history of depression, and has been feeling suicidal.  She has thought about jumping off a bridge among other ideas.  She just returned to the the area several days ago, and was living with somebody that she thought was a friend.   She states she has been off her medications for the past 3 or 4 months after she lost them while in [place].  The patient says she has been hearing voices.  She does admit to smoking methamphetamine, last time about 1 or 2 days ago.             *Legal Hold Status on Admission:  +                   *HPI:  She feels better today. She is still depressed but it is <6. She is not SI, no HI or psychosis. Smiling more readily. Spoke with her BF and she is welcome back. She denies paranoia.      Pt asks if there is a different form of lithium so she can swallow it easier.      *Psychiatric Examination:  Vitals: Blood pressure 105/59, pulse 70, temperature 36.6 °C (97.8 °F), temperature source Temporal, resp. rate 18, height 1.6 m (5' 3\"), weight 72.6 kg (160 lb), SpO2 100 %.  General Appearance: more alert  Abnormal Movements: none noted  Gait and Posture: wnl  Speech: wnl  Thought processes:normal rate  Associations: linear  Abnormal or Psychotic Thoughts: none  Judgement and Insight: improved  Orientation:grossly intact   Recent and Remote Memory: grossly intact  Attention Span and Concentration: intact  Language: fluid  Fund of Knowledge:not tested  Mood and Affect: much better/euthymic  SI/HI:denies      *ASSESSMENT/PLAN:  1. Bipolar disorder I now depressed without psychosis: improved  -change lithium to lithium citrate on discharge as they are easier to swallow: capsules. She agrees. Script.    -seroquel for sleep. Script. 50 mg 1-2 tabs at bedtime prn     2. Methamphetamine use disorder    -discussed     *Legal hold:  dc'd.         "   *Signing off

## 2019-04-03 NOTE — DISCHARGE PLANNING
Patient laying in bed.  No distress noted.  Awaiting placement to inpatient facility.  Will continue to monitor for safety.  Patient sitter outside room and in view of patient.

## 2019-04-03 NOTE — ED NOTES
Discharge instructions given to patient, told to follow up with resources given by alert team, told to return for new or concerning symptoms

## 2019-04-03 NOTE — ED PROVIDER NOTES
ED Provider Note    The patient continues to await transfer for inpatient psychiatric evaluation and treatment.  She has been fed.  She is received her morning medications.  Her vital signs remained normal.  She is currently resting with no acute needs.      4/3/2019  1245  The patient has been reevaluated by psychiatry and the legal hold has been lifted.  She will be discharged with outpatient referral options.

## 2019-04-03 NOTE — DISCHARGE PLANNING
Notice of withdrawal filed with the court via [employer]), scanned copy of verified notice onto .

## 2020-09-26 ENCOUNTER — HOSPITAL ENCOUNTER (EMERGENCY)
Dept: HOSPITAL 8 - ED | Age: 30
Discharge: HOME | End: 2020-09-26
Payer: MEDICAID

## 2020-09-26 VITALS — WEIGHT: 165.35 LBS | BODY MASS INDEX: 29.3 KG/M2 | HEIGHT: 63 IN

## 2020-09-26 VITALS — DIASTOLIC BLOOD PRESSURE: 90 MMHG | SYSTOLIC BLOOD PRESSURE: 167 MMHG

## 2020-09-26 DIAGNOSIS — E87.6: ICD-10-CM

## 2020-09-26 DIAGNOSIS — G43.909: ICD-10-CM

## 2020-09-26 DIAGNOSIS — H65.02: Primary | ICD-10-CM

## 2020-09-26 DIAGNOSIS — H92.02: ICD-10-CM

## 2020-09-26 PROCEDURE — 99283 EMERGENCY DEPT VISIT LOW MDM: CPT

## 2020-10-21 ENCOUNTER — HOSPITAL ENCOUNTER (EMERGENCY)
Dept: HOSPITAL 8 - ED | Age: 30
Discharge: HOME | End: 2020-10-21
Payer: MEDICAID

## 2020-10-21 VITALS — DIASTOLIC BLOOD PRESSURE: 84 MMHG | SYSTOLIC BLOOD PRESSURE: 113 MMHG

## 2020-10-21 VITALS — BODY MASS INDEX: 27.23 KG/M2 | HEIGHT: 63 IN | WEIGHT: 153.66 LBS

## 2020-10-21 DIAGNOSIS — L02.414: ICD-10-CM

## 2020-10-21 DIAGNOSIS — F15.10: ICD-10-CM

## 2020-10-21 DIAGNOSIS — L03.114: Primary | ICD-10-CM

## 2020-10-21 DIAGNOSIS — G43.909: ICD-10-CM

## 2020-10-21 DIAGNOSIS — R00.0: ICD-10-CM

## 2020-10-21 DIAGNOSIS — K21.9: ICD-10-CM

## 2020-10-21 DIAGNOSIS — Z72.9: ICD-10-CM

## 2020-10-21 DIAGNOSIS — F17.210: ICD-10-CM

## 2020-10-21 PROCEDURE — 99284 EMERGENCY DEPT VISIT MOD MDM: CPT

## 2020-10-21 PROCEDURE — 99283 EMERGENCY DEPT VISIT LOW MDM: CPT

## 2020-10-21 PROCEDURE — 90471 IMMUNIZATION ADMIN: CPT

## 2020-10-21 PROCEDURE — 90715 TDAP VACCINE 7 YRS/> IM: CPT

## 2020-10-21 PROCEDURE — 99406 BEHAV CHNG SMOKING 3-10 MIN: CPT

## 2020-10-21 PROCEDURE — 10060 I&D ABSCESS SIMPLE/SINGLE: CPT

## 2020-10-23 ENCOUNTER — HOSPITAL ENCOUNTER (EMERGENCY)
Dept: HOSPITAL 8 - ED | Age: 30
Discharge: LEFT BEFORE DISCHARGE | End: 2020-10-23
Payer: MEDICAID

## 2020-10-23 VITALS — BODY MASS INDEX: 27.7 KG/M2 | WEIGHT: 156.31 LBS | HEIGHT: 63 IN

## 2020-10-23 VITALS — SYSTOLIC BLOOD PRESSURE: 127 MMHG | DIASTOLIC BLOOD PRESSURE: 85 MMHG

## 2020-10-23 DIAGNOSIS — L02.414: Primary | ICD-10-CM

## 2020-10-23 PROCEDURE — 99281 EMR DPT VST MAYX REQ PHY/QHP: CPT

## 2020-10-25 ENCOUNTER — HOSPITAL ENCOUNTER (EMERGENCY)
Dept: HOSPITAL 8 - ED | Age: 30
Discharge: HOME | End: 2020-10-25
Payer: MEDICAID

## 2020-10-25 VITALS — SYSTOLIC BLOOD PRESSURE: 161 MMHG | DIASTOLIC BLOOD PRESSURE: 93 MMHG

## 2020-10-25 VITALS — BODY MASS INDEX: 27.89 KG/M2 | HEIGHT: 63 IN | WEIGHT: 157.41 LBS

## 2020-10-25 DIAGNOSIS — G43.909: ICD-10-CM

## 2020-10-25 DIAGNOSIS — L02.414: Primary | ICD-10-CM

## 2020-10-25 PROCEDURE — 99281 EMR DPT VST MAYX REQ PHY/QHP: CPT

## 2022-07-08 ENCOUNTER — HOSPITAL ENCOUNTER (EMERGENCY)
Facility: MEDICAL CENTER | Age: 32
End: 2022-07-08
Attending: EMERGENCY MEDICINE
Payer: MEDICAID

## 2022-07-08 VITALS
TEMPERATURE: 97.3 F | RESPIRATION RATE: 16 BRPM | WEIGHT: 130.07 LBS | BODY MASS INDEX: 23.05 KG/M2 | HEART RATE: 95 BPM | DIASTOLIC BLOOD PRESSURE: 93 MMHG | OXYGEN SATURATION: 97 % | SYSTOLIC BLOOD PRESSURE: 135 MMHG | HEIGHT: 63 IN

## 2022-07-08 DIAGNOSIS — F29 PSYCHOSIS, UNSPECIFIED PSYCHOSIS TYPE (HCC): ICD-10-CM

## 2022-07-08 DIAGNOSIS — Z91.148 HISTORY OF MEDICATION NONCOMPLIANCE: ICD-10-CM

## 2022-07-08 DIAGNOSIS — L02.91 ABSCESS: ICD-10-CM

## 2022-07-08 LAB
APPEARANCE UR: CLEAR
BACTERIA #/AREA URNS HPF: ABNORMAL /HPF
BILIRUB UR QL STRIP.AUTO: ABNORMAL
CAOX CRY #/AREA URNS HPF: ABNORMAL /HPF
COLOR UR: YELLOW
EPI CELLS #/AREA URNS HPF: ABNORMAL /HPF
GLUCOSE UR STRIP.AUTO-MCNC: NEGATIVE MG/DL
HCG UR QL: NEGATIVE
HIV 1+2 AB+HIV1 P24 AG SERPL QL IA: NORMAL
HYALINE CASTS #/AREA URNS LPF: ABNORMAL /LPF
KETONES UR STRIP.AUTO-MCNC: 15 MG/DL
LEUKOCYTE ESTERASE UR QL STRIP.AUTO: ABNORMAL
MICRO URNS: ABNORMAL
NITRITE UR QL STRIP.AUTO: NEGATIVE
PH UR STRIP.AUTO: 6 [PH] (ref 5–8)
PROT UR QL STRIP: 30 MG/DL
RBC # URNS HPF: ABNORMAL /HPF
RBC UR QL AUTO: ABNORMAL
SP GR UR STRIP.AUTO: 1.02
SPECIMEN SOURCE: NORMAL
T PALLIDUM AB SER QL IA: REACTIVE
TRICHOMONAS #/AREA URNS HPF: ABNORMAL /HPF
UROBILINOGEN UR STRIP.AUTO-MCNC: 2 MG/DL
WBC #/AREA URNS HPF: ABNORMAL /HPF

## 2022-07-08 PROCEDURE — 81025 URINE PREGNANCY TEST: CPT

## 2022-07-08 PROCEDURE — 86592 SYPHILIS TEST NON-TREP QUAL: CPT

## 2022-07-08 PROCEDURE — 86780 TREPONEMA PALLIDUM: CPT

## 2022-07-08 PROCEDURE — 700102 HCHG RX REV CODE 250 W/ 637 OVERRIDE(OP): Performed by: EMERGENCY MEDICINE

## 2022-07-08 PROCEDURE — 99284 EMERGENCY DEPT VISIT MOD MDM: CPT

## 2022-07-08 PROCEDURE — 87389 HIV-1 AG W/HIV-1&-2 AB AG IA: CPT

## 2022-07-08 PROCEDURE — A9270 NON-COVERED ITEM OR SERVICE: HCPCS | Performed by: EMERGENCY MEDICINE

## 2022-07-08 PROCEDURE — 700101 HCHG RX REV CODE 250

## 2022-07-08 PROCEDURE — 96372 THER/PROPH/DIAG INJ SC/IM: CPT | Mod: XU

## 2022-07-08 PROCEDURE — 81001 URINALYSIS AUTO W/SCOPE: CPT

## 2022-07-08 PROCEDURE — 700111 HCHG RX REV CODE 636 W/ 250 OVERRIDE (IP): Performed by: EMERGENCY MEDICINE

## 2022-07-08 PROCEDURE — 303977 HCHG I & D

## 2022-07-08 RX ORDER — RISPERIDONE 1 MG/1
1 TABLET ORAL 2 TIMES DAILY
Qty: 60 TABLET | Refills: 0 | Status: SHIPPED | OUTPATIENT
Start: 2022-07-08 | End: 2022-08-07

## 2022-07-08 RX ORDER — CEFTRIAXONE 500 MG/1
500 INJECTION, POWDER, FOR SOLUTION INTRAMUSCULAR; INTRAVENOUS ONCE
Status: COMPLETED | OUTPATIENT
Start: 2022-07-08 | End: 2022-07-08

## 2022-07-08 RX ORDER — AZITHROMYCIN 250 MG/1
1000 TABLET, FILM COATED ORAL ONCE
Status: COMPLETED | OUTPATIENT
Start: 2022-07-08 | End: 2022-07-08

## 2022-07-08 RX ORDER — RISPERIDONE 1 MG/1
1 TABLET ORAL ONCE
Status: COMPLETED | OUTPATIENT
Start: 2022-07-08 | End: 2022-07-08

## 2022-07-08 RX ADMIN — RISPERIDONE 1 MG: 1 TABLET ORAL at 15:48

## 2022-07-08 RX ADMIN — LIDOCAINE HYDROCHLORIDE 5 ML: 10 INJECTION, SOLUTION EPIDURAL; INFILTRATION; INTRACAUDAL; PERINEURAL at 15:47

## 2022-07-08 RX ADMIN — AZITHROMYCIN MONOHYDRATE 1000 MG: 250 TABLET ORAL at 15:48

## 2022-07-08 RX ADMIN — CEFTRIAXONE SODIUM 500 MG: 500 INJECTION, POWDER, FOR SOLUTION INTRAMUSCULAR; INTRAVENOUS at 15:47

## 2022-07-08 ASSESSMENT — FIBROSIS 4 INDEX: FIB4 SCORE: 0.92

## 2022-07-08 NOTE — ED PROVIDER NOTES
ED Provider Note    Scribed for Ryne Beard M.D. by Radha Tijerina. 7/8/2022  2:34 PM    Primary care provider: None.  Means of arrival: Walk in  History obtained from: patient   History limited by: none    CHIEF COMPLAINT  Chief Complaint   Patient presents with   • Abscess     Onset x 1 week on right anterior elbow   • Bump     On labia x 2 days   • UTI     Painful urination   frequency       HPI  Abel Angulo is a 31 y.o. female who presents to the Emergency Department for abscess to right anterior elbow onset 1 week ago. Denies IV drug use or use of methamphetamines. Patient has a history of bipolar disorder and has not taken her medications for around 1 month. Endorses auditory and visual hallucinations, but denies SI or HI. She has additional complaints of a bump on her labia that she noticed 2 days ago and is concerned for STD.    REVIEW OF SYSTEMS  Pertinent positives include abscess, visual hallucinations, auditory hallucinations, and bump on labia. Pertinent negatives include no SI or HI.     PAST MEDICAL HISTORY   has a past medical history of Acid reflux, Bipolar 1 disorder (HCC), Dental disorder, Depression, Migraine, Migraines, and Psychiatric problem.    SURGICAL HISTORY   has a past surgical history that includes other; esophagoscopy (4/5/2012); and gyn surgery.    SOCIAL HISTORY  Social History     Tobacco Use   • Smoking status: Current Some Day Smoker     Packs/day: 0.10     Years: 2.00     Pack years: 0.20     Types: Cigarettes   • Smokeless tobacco: Never Used   Vaping Use   • Vaping Use: Never used   Substance Use Topics   • Alcohol use: No     Comment: quit October 2 2013   • Drug use: Not Currently     Types: Inhaled     Comment: Marijuana      Social History     Substance and Sexual Activity   Drug Use Not Currently   • Types: Inhaled    Comment: Marijuana       FAMILY HISTORY  Family History   Family history unknown: Yes       CURRENT MEDICATIONS  Home Medications      "Reviewed by Jose Gaona R.N. (Registered Nurse) on 07/08/22 at 1245  Med List Status: Partial   Medication Last Dose Status   quetiapine (SEROQUEL) 50 MG tablet  Active   risperiDONE (RISPERDAL) 1 MG Tab  Active                ALLERGIES  Allergies   Allergen Reactions   • Advil [Ibuprofen] Anaphylaxis     Tongue swells   • Benadryl Allergy Rash     Rash     • Diphenhydramine      Other reaction(s): Rash, Unspecified       PHYSICAL EXAM  VITAL SIGNS: BP (!) 146/100   Pulse (!) 101   Temp 36.1 °C (97 °F) (Temporal)   Resp 16   Ht 1.6 m (5' 3\")   Wt 59 kg (130 lb 1.1 oz)   LMP  (LMP Unknown)   SpO2 96%   BMI 23.04 kg/m²     Constitutional: Well developed, Well nourished, mild distress, Non-toxic appearance.   HENT: Normocephalic, Atraumatic.  Oropharynx moist.   Eyes: PERRL, EOMI, Conjunctiva normal, No discharge.   Neck: no anterior cervical lymphadenopathy  CV: Good pulses  Thorax & Lungs: No respiratory distress.   : normal external genitalia, scant vaginal discharge  Skin: Warm, Dry, No erythema, No rash.   Musculoskeletal: Subacute appearing abscess to right AC  Neurologic: Awake, alert. Moves all extremities spontaneously.  Psychiatric: Positive auditory and visual hallucinations, but seemsoto be able to care for self, denies SI      LABS  Results for orders placed or performed during the hospital encounter of 07/08/22   Chlamydia/GC, PCR (Urine)    Specimen: Urine   Result Value Ref Range    Source Urine    T. Pallidum AB EIA (Syphilis)   Result Value Ref Range    Syphilis, Treponemal Qual Reactive (A) Non-Reactive   HIV AG/AB Combo Assay Screening   Result Value Ref Range    HIV Ag/Ab Combo Assay Non-Reactive Non Reactive   URINALYSIS CULTURE, IF INDICATED    Specimen: Urine   Result Value Ref Range    Color Yellow     Character Clear     Specific Gravity 1.025 <1.035    Ph 6.0 5.0 - 8.0    Glucose Negative Negative mg/dL    Ketones 15 (A) Negative mg/dL    Protein 30 (A) Negative mg/dL    " Bilirubin Moderate (A) Negative    Urobilinogen, Urine 2.0 Negative    Nitrite Negative Negative    Leukocyte Esterase Moderate (A) Negative    Occult Blood Trace (A) Negative    Micro Urine Req Microscopic    HCG QUALITATIVE UR   Result Value Ref Range    Beta-Hcg Urine Negative Negative   URINE MICROSCOPIC (W/UA)   Result Value Ref Range    WBC 10-20 (A) /hpf    RBC 0-2 /hpf    Bacteria Few (A) None /hpf    Epithelial Cells Many (A) /hpf    Ca Oxalate Crystal Few /hpf    Hyaline Cast 3-5 (A) /lpf    Trichomonas Few (A) None /hpf        RADIOLOGY  No orders to display     The radiologist's interpretation of all radiological studies have been reviewed by me.    Incision and Drainage Procedure Note    Indication: Abscess    Procedure: The patient was positioned appropriately and the skin over the incision site was prepped with alcohol. Local anesthesia was not performed at the patient's request.  An small incision was then made with an 18 gauge needle over the apex of the lesion and approximately 2 cc of purulent material was expressed. Loculations were not present. The drainage cavity was then dressed with a bandage.    The patient tolerated the procedure well.    Complications: None    COURSE & MEDICAL DECISION MAKING  Pertinent Labs & Imaging studies reviewed. (See chart for details)    I reviewed the patient's medical records which showed she has a history of bipolar. She was seen here in 2019 for methamphetamine abuse. Additionally the patient was seen in [place] in May and had an endoscopy.     2:34 PM - Patient seen and examined at bedside. Incision and drainage procedure preformed at this time (see procedure note above for details). I infromed her that we will test and ptreat her for STDs. Additionally she will be started on her psychiatric medications.  Patient will be treated with Rocephin 500 mg, azithromycin 1000 mg, and Risperdal 1 mg. Ordered HCG qual, UA, Chlamydia/GC, T. Pallidum, RPR Titer and HIV  AG/AB to evaluate her symptoms. I informed her the STD results will result in a few days. Prescribed Risperdal. Discussed return precautions. Patient will be discharged at this time. She verbalizes agreement with discharge and plan of care.     Decision Making:  Forearm abscess, no abscess or abnormality of the genitalia, the patient does have a history of psychiatric disorder but I do not believe the patient needs to be placed on a legal hold, we will restart the patient's respite all, and I indeed the patient's abscess.  Will discharge patient home, have the patient return with worsening symptoms.      The patient will return for new or worsening symptoms and is stable at the time of discharge.    DISPOSITION:  Patient will be discharged home in stable condition.    FOLLOW UP:  [facility], Emergency Dept  [address] [place] [id number] [phone]    If symptoms worsen      OUTPATIENT MEDICATIONS:  Discharge Medication List as of 7/8/2022  4:56 PM           FINAL IMPRESSION  1. Abscess    2. Psychosis, unspecified psychosis type (HCC)    3. History of medication noncompliance          Radha RUEDA (Remi), am scribing for, and in the presence of, Ryne Beard M.D..    Electronically signed by: Radha Tijerina (Remi), 7/8/2022    Ryne RUEDA M.D. personally performed the services described in this documentation, as scribed by Radha Tijerina in my presence, and it is both accurate and complete.    The note accurately reflects work and decisions made by me.  Ryne Beard M.D.  7/8/2022  9:06 PM

## 2022-07-08 NOTE — ED NOTES
Pt asking for resources, unclear exactly what kind as pt is still somewhat unclear and rambling, pt is calm and cooperative, took PO and IM medications.

## 2022-07-08 NOTE — ED TRIAGE NOTES
Abel Rg Kev  31 y.o. female  Chief Complaint   Patient presents with   • Abscess     Onset x 1 week on right anterior elbow   • Bump     On labia x 2 days   • UTI     Painful urination   frequency     Pt ambulatory to triage with steady gait for above complaint. Patient also endorses auditory and visual hallucinations, denies command hallucinations. Patient denies drug use. Patient has restless purposeless movements in triage.     Patient requesting to speak with social work for housing resources.    Pt is G[employer] 15, speaking in full sentences, follows commands and responds appropriately to questions. Resp are even and unlabored.     STD testing protocol ordered. Pt placed in seth by draw room. Pt educated on triage process. Pt encouraged to alert staff for any changes.     This RN masked and in appropriate PPE during encounter.     Vitals:    07/08/22 1224   BP: (!) 146/100   Pulse: (!) 101   Resp: 16   Temp: 36.1 °C (97 °F)   SpO2: 96%

## 2022-07-09 NOTE — ED NOTES
Pt given discharge instructions/prescription sent to pharm of choosing/ home care instructions explained, pt verbalized understanding of instructions given. Pt reports that she would like to have peer support services provide information. Peer support notified.

## 2022-07-11 LAB — RPR SER QL: NON REACTIVE

## 2022-07-22 ENCOUNTER — HOSPITAL ENCOUNTER (EMERGENCY)
Facility: MEDICAL CENTER | Age: 32
End: 2022-07-22
Attending: EMERGENCY MEDICINE
Payer: MEDICAID

## 2022-07-22 VITALS
BODY MASS INDEX: 22.15 KG/M2 | DIASTOLIC BLOOD PRESSURE: 84 MMHG | TEMPERATURE: 98.9 F | HEIGHT: 63 IN | HEART RATE: 57 BPM | RESPIRATION RATE: 16 BRPM | OXYGEN SATURATION: 92 % | SYSTOLIC BLOOD PRESSURE: 144 MMHG | WEIGHT: 125 LBS

## 2022-07-22 DIAGNOSIS — Z76.0 MEDICATION REFILL: ICD-10-CM

## 2022-07-22 DIAGNOSIS — F31.9 BIPOLAR 1 DISORDER (HCC): ICD-10-CM

## 2022-07-22 PROCEDURE — 99283 EMERGENCY DEPT VISIT LOW MDM: CPT

## 2022-07-22 RX ORDER — QUETIAPINE FUMARATE 50 MG/1
50 TABLET, FILM COATED ORAL EVERY EVENING
Qty: 30 TABLET | Refills: 0 | Status: SHIPPED | OUTPATIENT
Start: 2022-07-22 | End: 2022-10-25

## 2022-07-22 RX ORDER — RISPERIDONE 1 MG/1
1 TABLET ORAL 2 TIMES DAILY
Qty: 60 TABLET | Refills: 0 | Status: SHIPPED | OUTPATIENT
Start: 2022-07-22 | End: 2022-10-25

## 2022-07-22 NOTE — ED NOTES
Discharge orders received. Discharge instructions provided by ERP. AVS and printed prescription provided and reviewed with patient. Patient AOx4 and ambulatory. Patient given [employer] cuisine. No IV started during ED visit. Patient discharged to self without incident.

## 2022-07-22 NOTE — ED NOTES
Patient ambulated to [facility]. Patient oriented to care area. Primary assessment complete. Chart up for ERP.

## 2022-07-22 NOTE — ED PROVIDER NOTES
"ED Provider Note    ER PROVIDER NOTE      CHIEF COMPLAINT  Chief Complaint   Patient presents with   • Medication Refill     Pt did meth last night. Pt needs her schizophrenia meds and bipolar meds refilled. Pt does not know what meds she takes        HPI  Dorothea Paiz is a 31 y.o. female who presents to the emergency department complaining of need for medication refill.  Patient states she needs her medications for bipolar and schizoaffective although she does not know the names of the medication.  She is not sure the last time she took these may be a few weeks ago.  She reports no symptoms at this time, hallucinations or feelings of restlessness.  No suicidal thoughts.  No reports of depression.  No fevers chills cough or congestion, no headaches    REVIEW OF SYSTEMS  Pertinent positives include medication refill. Pertinent negatives include no suicidal thoughts. See HPI for details. All other systems reviewed and are negative.    PAST MEDICAL HISTORY       SOCIAL HISTORY  Social History     Tobacco Use   • Smoking status: Current Every Day Smoker     Types: Cigarettes   • Smokeless tobacco: Never Used   Substance Use Topics   • Alcohol use: Never   • Drug use: Yes     Types: Inhaled, Intravenous     Comment: meth       SURGICAL HISTORY  patient denies any surgical history    CURRENT MEDICATIONS  Home Medications     Reviewed by Meghann Roth R.N. (Registered Nurse) on 07/22/22 at 1107  Med List Status: Complete   Medication Last Dose Status        Patient Grzegorz Taking any Medications                       ALLERGIES  Allergies   Allergen Reactions   • Advil [Ibuprofen]      Tongue swelling   • Benadryl Allergy      Rash all over       PHYSICAL EXAM  VITAL SIGNS: /63   Pulse 81   Temp 37.2 °C (98.9 °F) (Temporal)   Resp 16   Ht 1.6 m (5' 3\")   Wt 56.7 kg (125 lb)   SpO2 97%   BMI 22.14 kg/m²   Pulse ox interpretation: I interpret this pulse ox as normal.    Constitutional: Sleeping comfortably but " easily arousable in no apparent distress.  HENT: Normocephalic, Atraumatic, Bilateral external ears normal. Nose normal.   Eyes: Pupils are equal and reactive. Conjunctiva normal, non-icteric.   Heart: Regular rate and rhythm, no murmurs.    Lungs: Clear to auscultation bilaterally.  Skin: Warm, Dry, No erythema, No rash.   Musculoskeletal: No tenderness or major deformities noted. No edema.  Neurologic: Alert, Grossly non-focal.   Psychiatric: Affect normal, Judgment normal, Mood normal, Appears appropriate    DIAGNOSTIC STUDIES / PROCEDURES        RADIOLOGY  No orders to display     The radiologist's interpretation of all radiological studies have been reviewed and independently viewed by me.    COURSE & MEDICAL DECISION MAKING  Nursing notes, VS, PMSFHx reviewed in chart.    11:35 AM - Patient seen and examined at bedside.     12:40 PM  Patient was initially misregistered, subsequently were able to find her outpatient prescriptions and I will refill these        Decision Making:  This is a 31 y.o. female seen with need for medication refill.  She will be refilled for a months worth of her Risperdal and Seroquel, and resources for follow-up through [facility].  This point she does not appear to be decompensated from a psychiatric standpoint and no medical complaints to suggest acute medical pathology    The patient will return for new or worsening symptoms and is stable at the time of discharge.    The patient is referred to a primary physician for blood pressure management, diabetic screening, and for all other preventative health concerns.      DISPOSITION:  Patient will be discharged home in stable condition.    FOLLOW UP:  [facility] ([facility]) - Primary Care and Family Medicine  [facility] [address] [place] [id number]  [phone]  Schedule an appointment as soon as possible for a visit         OUTPATIENT MEDICATIONS:  New Prescriptions    QUETIAPINE (SEROQUEL) 50 MG TABLET    Take  1 Tablet by mouth every evening.    RISPERIDONE (RISPERDAL) 1 MG TAB    Take 1 Tablet by mouth 2 times a day.         FINAL IMPRESSION  1. Medication refill    2. Bipolar 1 disorder (HCC)        The note accurately reflects work and decisions made by me.  Jeremy Boyle M.D.  7/22/2022  12:40 PM

## 2022-10-20 NOTE — ED NOTES
Pt resting in room. No distress. Pt has no concerns or requests at this time. Respirations equal and unlabored. 1:1 sitter at bedside providing constant supervision.     10-20    131<L>  |  98  |  5<L>  ----------------------------<  163<H>  3.4<L>   |  22  |  0.61    Ca    9.4      20 Oct 2022 10:15  Phos  3.0     10-20  Mg     1.90     10-20    TPro  7.0  /  Alb  3.6  /  TBili  0.6  /  DBili  x   /  AST  19  /  ALT  20  /  AlkPhos  74  10-20  POCT Blood Glucose.: 149 mg/dL (10-20-22 @ 14:07)  A1C with Estimated Average Glucose Result: 12.0 % (10-19-22 @ 08:00)

## 2022-10-25 ENCOUNTER — HOSPITAL ENCOUNTER (EMERGENCY)
Facility: MEDICAL CENTER | Age: 32
End: 2022-10-26
Attending: EMERGENCY MEDICINE
Payer: MEDICAID

## 2022-10-25 DIAGNOSIS — F29 PSYCHOSIS, UNSPECIFIED PSYCHOSIS TYPE (HCC): ICD-10-CM

## 2022-10-25 DIAGNOSIS — F15.10 METHAMPHETAMINE ABUSE (HCC): ICD-10-CM

## 2022-10-25 DIAGNOSIS — F22 PARANOIA (HCC): ICD-10-CM

## 2022-10-25 LAB
AMPHET UR QL SCN: POSITIVE
BARBITURATES UR QL SCN: NEGATIVE
BENZODIAZ UR QL SCN: NEGATIVE
BZE UR QL SCN: NEGATIVE
CANNABINOIDS UR QL SCN: NEGATIVE
METHADONE UR QL SCN: NEGATIVE
OPIATES UR QL SCN: NEGATIVE
OXYCODONE UR QL SCN: NEGATIVE
PCP UR QL SCN: NEGATIVE
POC BREATHALIZER: 0 PERCENT (ref 0–0.01)
PROPOXYPH UR QL SCN: NEGATIVE

## 2022-10-25 PROCEDURE — 700102 HCHG RX REV CODE 250 W/ 637 OVERRIDE(OP): Performed by: EMERGENCY MEDICINE

## 2022-10-25 PROCEDURE — 90791 PSYCH DIAGNOSTIC EVALUATION: CPT

## 2022-10-25 PROCEDURE — 80307 DRUG TEST PRSMV CHEM ANLYZR: CPT

## 2022-10-25 PROCEDURE — 99285 EMERGENCY DEPT VISIT HI MDM: CPT

## 2022-10-25 PROCEDURE — 700111 HCHG RX REV CODE 636 W/ 250 OVERRIDE (IP): Performed by: EMERGENCY MEDICINE

## 2022-10-25 PROCEDURE — 96372 THER/PROPH/DIAG INJ SC/IM: CPT

## 2022-10-25 PROCEDURE — A9270 NON-COVERED ITEM OR SERVICE: HCPCS | Performed by: EMERGENCY MEDICINE

## 2022-10-25 PROCEDURE — 302970 POC BREATHALIZER: Performed by: EMERGENCY MEDICINE

## 2022-10-25 RX ORDER — ACETAMINOPHEN 160 MG/5ML
650 SUSPENSION ORAL ONCE
Status: COMPLETED | OUTPATIENT
Start: 2022-10-25 | End: 2022-10-25

## 2022-10-25 RX ADMIN — ACETAMINOPHEN 650 MG: 160 SUSPENSION ORAL at 20:08

## 2022-10-25 RX ADMIN — PENICILLIN G BENZATHINE 2.4 MILLION UNITS: 1200000 INJECTION, SUSPENSION INTRAMUSCULAR at 16:39

## 2022-10-25 ASSESSMENT — ENCOUNTER SYMPTOMS
HALLUCINATIONS: 1
NERVOUS/ANXIOUS: 1

## 2022-10-25 ASSESSMENT — LIFESTYLE VARIABLES: SUBSTANCE_ABUSE: 0

## 2022-10-25 ASSESSMENT — FIBROSIS 4 INDEX: FIB4 SCORE: 0.79
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
## 2022-10-25 NOTE — ED NOTES
Pt resting in bed, 1:1 sitter at door for pt safety, NAD, pt aware POC to wait for results and see alert team RN

## 2022-10-25 NOTE — ED TRIAGE NOTES
Patient comes in with complaints of hearing voices that she believes are chasing her and following her, states she has feelings of SI but no specific plan in place.

## 2022-10-25 NOTE — ED NOTES
Break RN: Pt changing into hospital gown, personal possessions taken for safe keeping, all potentially harmful items removed from room, pt resting in bed, GCS 14 with hallucinations, NAD, behavior calm, up for ERP to see

## 2022-10-25 NOTE — ED PROVIDER NOTES
"ED Provider Note    Scribed for Jasson Dailey M.D. by Isac Veloz. 10/25/2022, 1:46 PM.    Primary care provider: Pcp Pt States None  Means of arrival: Walk-in  History obtained from: Patient  History limited by: None    CHIEF COMPLAINT  Chief Complaint   Patient presents with    Psych Eval       HPI  Abel Angulo is a 32 y.o. female who presents to the Emergency Department for psychiatric evaluation. Patient states \"there are people following me, and I'm not making it up and I'm not talking out of my ass.\" She reports taking medications for psychiatric issues. Denies any recent drug usage.  Denies any other medical problems or complaints.  Pain or fever.  States she would like some food.  Endorses drug use.  No other acute concerns    REVIEW OF SYSTEMS  Review of Systems   Psychiatric/Behavioral:  Positive for hallucinations. Negative for substance abuse. The patient is nervous/anxious.    All other systems reviewed and are negative.    PAST MEDICAL HISTORY   has a past medical history of Acid reflux, Bipolar 1 disorder (HCC), Dental disorder, Depression, Migraine, Migraines, and Psychiatric problem.    SURGICAL HISTORY   has a past surgical history that includes other; esophagoscopy (4/5/2012); and gyn surgery.    SOCIAL HISTORY  Social History     Tobacco Use    Smoking status: Every Day     Types: Cigarettes    Smokeless tobacco: Never   Vaping Use    Vaping Use: Never used   Substance Use Topics    Alcohol use: Never     Comment: quit October 2 2013    Drug use: Yes     Types: Intravenous, Inhaled     Comment: Marijuana      Social History     Substance and Sexual Activity   Drug Use Yes    Types: Intravenous, Inhaled    Comment: Marijuana       FAMILY HISTORY  Family History   Family history unknown: Yes       CURRENT MEDICATIONS  Home Medications       Reviewed by Irina Chin R.N. (Registered Nurse) on 10/25/22 at 1332  Med List Status: <None>     Medication Last Dose Status "   quetiapine (SEROQUEL) 50 MG tablet  Active   QUEtiapine (SEROQUEL) 50 MG tablet  Active   risperiDONE (RISPERDAL) 1 MG Tab  Active                    ALLERGIES  Allergies   Allergen Reactions    Advil [Ibuprofen] Anaphylaxis     Tongue swells    Advil [Ibuprofen]      Tongue swelling    Benadryl Allergy Rash     Rash      Benadryl Allergy      Rash all over    Diphenhydramine      Other reaction(s): Rash, Unspecified       PHYSICAL EXAM  VITAL SIGNS: /80   Pulse 89   Temp 36.7 °C (98.1 °F) (Temporal)   Resp 16   Wt 53 kg (116 lb 13.5 oz)   SpO2 97%   BMI 20.70 kg/m²   Vitals reviewed.  Constitutional: Awake alert lungs No acute distress.  HENT: Normocephalic, Atraumatic, Bilateral external ears normal, Oropharynx moist, No oral exudates, Nose normal.   Eyes: PERRL, EOMI, Conjunctiva normal, No discharge.   Neck: Normal range of motion, No tenderness, Supple, No stridor.   Cardiovascular: Normal heart rate, Normal rhythm, No murmurs, No rubs, No gallops.   Thorax & Lungs: Normal breath sounds, No respiratory distress, No wheezing,  Abdomen: Bowel sounds normal, Soft, No tenderness  Musculoskeletal: Good range of motion in all major joints.   Neurologic: Alert,  No focal deficits noted.   Psychiatric: Denies being suicidal, has paranoid delusions.    LABS  Results for orders placed or performed during the hospital encounter of 10/25/22   URINE DRUG SCREEN   Result Value Ref Range    Amphetamines Urine Positive (A) Negative    Barbiturates Negative Negative    Benzodiazepines Negative Negative    Cocaine Metabolite Negative Negative    Methadone Negative Negative    Opiates Negative Negative    Oxycodone Negative Negative    Phencyclidine -Pcp Negative Negative    Propoxyphene Negative Negative    Cannabinoid Metab Negative Negative   POC BREATHALIZER   Result Value Ref Range    POC Breathalizer 0 0.00 - 0.01 Percent     All labs reviewed by me.      COURSE & MEDICAL DECISION MAKING  Pertinent Labs &  Imaging studies reviewed. (See chart for details)    Obtained and reviewed past medical records from previous visit for baseline labs and previous work-up for comparison.  Dysuria RPR was positive in July of this year we will confirm that has been treated by consulting pharmacy.  If not we will treat her.  She does have a history of psychosis      1:46 PM Patient seen and examined at bedside. The patient presents with need for psychiatric evaluation, and the differential diagnosis includes but is not limited to drug use, decompensated schizophrenia and psychosis.. Ordered for POC Breathalizer and UDS to evaluate. Behavioral Health to consult.     The patient has chronic psychosis and this appears to be an acute exacerbation of chronic psychosis.  She is doing methamphetamine.  She been seen by [facility] and [facility] will place her on a hold for being gravely disabled.      Will fill the paperwork.  Should be transferred for ongoing psychiatric care and treatment.            FINAL IMPRESSION  1. Psychosis, unspecified psychosis type (HCC)    2. Methamphetamine abuse (HCC)    3. Paranoia (HCC)          Isac RUEDA (Scribe), am scribing for, and in the presence of, Jasson Dailey M.D..    Electronically signed by: Isac Veloz (Scribe), 10/25/2022    Jasson RUEDA M.D. personally performed the services described in this documentation, as scribed by Isac Veloz in my presence, and it is both accurate and complete.    The note accurately reflects work and decisions made by me.  Jasson Dailey M.D.  10/25/2022  3:50 PM

## 2022-10-26 VITALS
DIASTOLIC BLOOD PRESSURE: 57 MMHG | TEMPERATURE: 98 F | OXYGEN SATURATION: 98 % | BODY MASS INDEX: 20.7 KG/M2 | RESPIRATION RATE: 16 BRPM | SYSTOLIC BLOOD PRESSURE: 111 MMHG | WEIGHT: 116.84 LBS | HEART RATE: 89 BPM

## 2022-10-26 LAB
ALBUMIN SERPL BCP-MCNC: 3.6 G/DL (ref 3.2–4.9)
ALBUMIN/GLOB SERPL: 1.1 G/DL
ALP SERPL-CCNC: 40 U/L (ref 30–99)
ALT SERPL-CCNC: 9 U/L (ref 2–50)
ANION GAP SERPL CALC-SCNC: 8 MMOL/L (ref 7–16)
AST SERPL-CCNC: 14 U/L (ref 12–45)
BASOPHILS # BLD AUTO: 0.7 % (ref 0–1.8)
BASOPHILS # BLD: 0.04 K/UL (ref 0–0.12)
BILIRUB SERPL-MCNC: 0.3 MG/DL (ref 0.1–1.5)
BUN SERPL-MCNC: 10 MG/DL (ref 8–22)
CALCIUM SERPL-MCNC: 8.6 MG/DL (ref 8.5–10.5)
CHLORIDE SERPL-SCNC: 106 MMOL/L (ref 96–112)
CO2 SERPL-SCNC: 24 MMOL/L (ref 20–33)
CREAT SERPL-MCNC: 0.64 MG/DL (ref 0.5–1.4)
EOSINOPHIL # BLD AUTO: 0.28 K/UL (ref 0–0.51)
EOSINOPHIL NFR BLD: 5.1 % (ref 0–6.9)
ERYTHROCYTE [DISTWIDTH] IN BLOOD BY AUTOMATED COUNT: 43.6 FL (ref 35.9–50)
FLUAV RNA SPEC QL NAA+PROBE: NEGATIVE
FLUBV RNA SPEC QL NAA+PROBE: NEGATIVE
GFR SERPLBLD CREATININE-BSD FMLA CKD-EPI: 120 ML/MIN/1.73 M 2
GLOBULIN SER CALC-MCNC: 3.2 G/DL (ref 1.9–3.5)
GLUCOSE SERPL-MCNC: 132 MG/DL (ref 65–99)
HCT VFR BLD AUTO: 38.9 % (ref 37–47)
HGB BLD-MCNC: 12.5 G/DL (ref 12–16)
IMM GRANULOCYTES # BLD AUTO: 0.01 K/UL (ref 0–0.11)
IMM GRANULOCYTES NFR BLD AUTO: 0.2 % (ref 0–0.9)
LYMPHOCYTES # BLD AUTO: 2.42 K/UL (ref 1–4.8)
LYMPHOCYTES NFR BLD: 43.7 % (ref 22–41)
MCH RBC QN AUTO: 28 PG (ref 27–33)
MCHC RBC AUTO-ENTMCNC: 32.1 G/DL (ref 33.6–35)
MCV RBC AUTO: 87.2 FL (ref 81.4–97.8)
MONOCYTES # BLD AUTO: 0.38 K/UL (ref 0–0.85)
MONOCYTES NFR BLD AUTO: 6.9 % (ref 0–13.4)
NEUTROPHILS # BLD AUTO: 2.41 K/UL (ref 2–7.15)
NEUTROPHILS NFR BLD: 43.4 % (ref 44–72)
NRBC # BLD AUTO: 0 K/UL
NRBC BLD-RTO: 0 /100 WBC
PLATELET # BLD AUTO: 222 K/UL (ref 164–446)
PMV BLD AUTO: 8.7 FL (ref 9–12.9)
POTASSIUM SERPL-SCNC: 4.1 MMOL/L (ref 3.6–5.5)
PROT SERPL-MCNC: 6.8 G/DL (ref 6–8.2)
RBC # BLD AUTO: 4.46 M/UL (ref 4.2–5.4)
RSV RNA SPEC QL NAA+PROBE: NEGATIVE
SARS-COV-2 RNA RESP QL NAA+PROBE: NOTDETECTED
SODIUM SERPL-SCNC: 138 MMOL/L (ref 135–145)
SPECIMEN SOURCE: NORMAL
WBC # BLD AUTO: 5.5 K/UL (ref 4.8–10.8)

## 2022-10-26 PROCEDURE — 0241U HCHG SARS-COV-2 COVID-19 NFCT DS RESP RNA 4 TRGT MIC: CPT

## 2022-10-26 PROCEDURE — 80053 COMPREHEN METABOLIC PANEL: CPT

## 2022-10-26 PROCEDURE — 85025 COMPLETE CBC W/AUTO DIFF WBC: CPT

## 2022-10-26 PROCEDURE — 36415 COLL VENOUS BLD VENIPUNCTURE: CPT

## 2022-10-26 PROCEDURE — 99284 EMERGENCY DEPT VISIT MOD MDM: CPT | Mod: GC | Performed by: PSYCHIATRY & NEUROLOGY

## 2022-10-26 PROCEDURE — 700101 HCHG RX REV CODE 250: Performed by: EMERGENCY MEDICINE

## 2022-10-26 PROCEDURE — C9803 HOPD COVID-19 SPEC COLLECT: HCPCS

## 2022-10-26 RX ADMIN — MUPIROCIN 1 APPLICATION: 20 OINTMENT TOPICAL at 19:07

## 2022-10-26 RX ADMIN — MUPIROCIN 1 ML: 20 OINTMENT TOPICAL at 04:30

## 2022-10-26 ASSESSMENT — ENCOUNTER SYMPTOMS
NERVOUS/ANXIOUS: 1
COUGH: 0
ABDOMINAL PAIN: 0
FEVER: 0
HALLUCINATIONS: 1
SORE THROAT: 0
VOMITING: 0
BLURRED VISION: 0
SHORTNESS OF BREATH: 0
DIZZINESS: 0
CONSTIPATION: 1
INSOMNIA: 1
MYALGIAS: 0
DEPRESSION: 0
CHILLS: 0
HEADACHES: 1
NAUSEA: 0
PALPITATIONS: 0
DOUBLE VISION: 0
WEAKNESS: 0
TREMORS: 0
HEARTBURN: 1
SEIZURES: 0

## 2022-10-26 ASSESSMENT — LIFESTYLE VARIABLES: SUBSTANCE_ABUSE: 1

## 2022-10-26 NOTE — ED NOTES
Med rec updated and complete. Unable to assess allergies. Pt unable to participate in an interview . Placed call to listed home pharmacy. No current active profile. > 3 months since last fill at [facility].     QUETIAPINE 50 MG   mg QHS  RISPERIDONE 1 mg BID        Listed home pharmacy [facility] [phone]

## 2022-10-26 NOTE — ED NOTES
Report received from RN. Assumed care of pt. Pt sleeping on gurney, visible chest rise and fall. Sitter outside room, report given.

## 2022-10-26 NOTE — DISCHARGE PLANNING
[facility] ED Behavioral Health Fax Referral      [facility] ED Behavioral Health Alert Team:  [phone]    Referral: Legal Hold    Intervention: Patient referral to [facility] inpatient  facillity    Legal Hold Initiated: Date: 10/25/22 Time: 1545    Patient’s Insurance Listed on Face Sheet: Medicaid FFS    Referrals sent to: [facility] , [facility]     Referrals faxed by: CHRISTA Harper    This referral contains the following information:  Face sheet __x__  Page 1 and Page 2 of Legal Hold __x__  Alert Team Assessment/Psych Assessment ___x_  Head to toe physical exam __x__  Urine Drug Screen ___x_  Blood Alcohol __x__  Vital signs __x__  Pregnancy test when applicable ___  Medications list ___x_  Covid screening ____    Plan: Patient will transfer to mental health facility once acceptance is obtained

## 2022-10-26 NOTE — ED NOTES
"Assumed care of patient. Pt complains of headache and nare pain. Pt has cracked area of skin around nare. Reports dry \"crusty\" feeling in nose. MD notified.  in direct view of patient   "

## 2022-10-26 NOTE — DISCHARGE PLANNING
Alert Team Note:    Contacted by [place], spoke to Zoë. Pt referral has been received and is currently being reviewed.

## 2022-10-26 NOTE — DISCHARGE PLANNING
Alert Team Note:    Contacted by [facility], spoke to Zoë. Pt has been accepted PENDING a negative Covid test, an infection control form, and a nurse to nurse report.     Informed Alert Team CHRISTA Man and CHRISTA Daley.

## 2022-10-26 NOTE — DISCHARGE PLANNING
Alert Team Note:    Contacted by [facility], spoke to Zoë. Facility has requested a CBC and CMP.   Informed CHRISTA Brown.

## 2022-10-26 NOTE — ED NOTES
Pt up to restroom with steady gait. Calm and cooperative. Given additional snacks per request. MD Jacobson at bedside to evaulate nare discomfort- per MD, yellow crusting noted on nare. New order obtained for Mupirocin
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
## 2022-10-26 NOTE — ED NOTES
Patient's home medications have been reviewed by the pharmacy team.     Past Medical History:   Diagnosis Date    Acid reflux     Bipolar 1 disorder (HCC)     Dental disorder     Depression     anxiety    Migraine     Migraines     Psychiatric problem     depression       Patient's Medications   New Prescriptions    No medications on file   Previous Medications    No medications on file   Modified Medications    No medications on file   Discontinued Medications    QUETIAPINE (SEROQUEL) 50 MG TABLET    1-2 tabs as needed for sleep at HS.    QUETIAPINE (SEROQUEL) 50 MG TABLET    Take 1 Tablet by mouth every evening.    RISPERIDONE (RISPERDAL) 1 MG TAB    Take 1 Tablet by mouth 2 times a day.          A:  Medications do not appear to be contributing to current complaints.     P:    No recommendations at this time. Home medications have been reordered as appropriate.    Gareth Tate, PharmD

## 2022-10-27 NOTE — DISCHARGE PLANNING
Legal Hold Transfer     Referral: Legal hold transfer to Mental Health Facility     Intervention: Contacted by [facility], spoke to Zoë. Pt has been accepted PENDING a negative Covid test.     Pt's accepting physcian is Dr. Pena     Transport arranged through Meghann at [facility]    Spoke to Joshua with [facility]     The pt will be picked up at 2230      Notified Bedside RN Thuy and Alert Team RN Magda of the departure time as well as accepting facility.      Transfer packet created with original legal hold and placed on chart.      Plan: Pt will be transferring to [facility] via [facility] at 2230 if the Covid test comes back negative.

## 2022-10-27 NOTE — DISCHARGE SUMMARY
ED Observation Discharge Summary    Patient:Abel Angulo  Patient : 1990  Patient MRN: [id number]  Patient PCP: Pcp Pt States None    Admit Date: 10/25/2022  Discharge Date and Time: 10/26/22 1030 PM  Discharge Diagnosis:   1. Psychosis, unspecified psychosis type (HCC)        2. Methamphetamine abuse (HCC)        3. Paranoia (HCC)            Discharge Attending: Ryne Beard M.D.  Discharge Service: ED Observation    ED Course  Abel is a 32 y.o. female who was evaluated at [facility] for psychosis.  Patient also has a history of methamphetamine abuse.  The patient was seen and evaluated by psychiatry and felt the patient need to be transferred to psychiatric facility.  Patient is excepted to a psychiatric facility and will be transferred    Discharge Exam:  /54   Pulse 87   Temp 36.7 °C (98.1 °F) (Temporal)   Resp 16   Wt 53 kg (116 lb 13.5 oz)   SpO2 98%   BMI 20.70 kg/m² .    Constitutional: Awake and alert. Nontoxic  HENT:  Grossly normal  Eyes: Grossly normal  Neck: Normal range of motion  Cardiovascular: Normal heart rate   Thorax & Lungs: No respiratory distress  Abdomen: Nontender  Skin:  No pathologic rash.   Extremities: Well perfused  Psychiatric: Affect normal    Labs  Results for orders placed or performed during the hospital encounter of 10/25/22   URINE DRUG SCREEN   Result Value Ref Range    Amphetamines Urine Positive (A) Negative    Barbiturates Negative Negative    Benzodiazepines Negative Negative    Cocaine Metabolite Negative Negative    Methadone Negative Negative    Opiates Negative Negative    Oxycodone Negative Negative    Phencyclidine -Pcp Negative Negative    Propoxyphene Negative Negative    Cannabinoid Metab Negative Negative   CBC WITH DIFFERENTIAL   Result Value Ref Range    WBC 5.5 4.8 - 10.8 K/uL    RBC 4.46 4.20 - 5.40 M/uL    Hemoglobin 12.5 12.0 - 16.0 g/dL    Hematocrit 38.9 37.0 - 47.0 %    MCV 87.2 81.4 - 97.8 fL    MCH 28.0  27.0 - 33.0 pg    MCHC 32.1 (L) 33.6 - 35.0 g/dL    RDW 43.6 35.9 - 50.0 fL    Platelet Count 222 164 - 446 K/uL    MPV 8.7 (L) 9.0 - 12.9 fL    Neutrophils-Polys 43.40 (L) 44.00 - 72.00 %    Lymphocytes 43.70 (H) 22.00 - 41.00 %    Monocytes 6.90 0.00 - 13.40 %    Eosinophils 5.10 0.00 - 6.90 %    Basophils 0.70 0.00 - 1.80 %    Immature Granulocytes 0.20 0.00 - 0.90 %    Nucleated RBC 0.00 /100 WBC    Neutrophils (Absolute) 2.41 2.00 - 7.15 K/uL    Lymphs (Absolute) 2.42 1.00 - 4.80 K/uL    Monos (Absolute) 0.38 0.00 - 0.85 K/uL    Eos (Absolute) 0.28 0.00 - 0.51 K/uL    Baso (Absolute) 0.04 0.00 - 0.12 K/uL    Immature Granulocytes (abs) 0.01 0.00 - 0.11 K/uL    NRBC (Absolute) 0.00 K/uL   Comp Metabolic Panel   Result Value Ref Range    Sodium 138 135 - 145 mmol/L    Potassium 4.1 3.6 - 5.5 mmol/L    Chloride 106 96 - 112 mmol/L    Co2 24 20 - 33 mmol/L    Anion Gap 8.0 7.0 - 16.0    Glucose 132 (H) 65 - 99 mg/dL    Bun 10 8 - 22 mg/dL    Creatinine 0.64 0.50 - 1.40 mg/dL    Calcium 8.6 8.5 - 10.5 mg/dL    AST(SGOT) 14 12 - 45 U/L    ALT(SGPT) 9 2 - 50 U/L    Alkaline Phosphatase 40 30 - 99 U/L    Total Bilirubin 0.3 0.1 - 1.5 mg/dL    Albumin 3.6 3.2 - 4.9 g/dL    Total Protein 6.8 6.0 - 8.2 g/dL    Globulin 3.2 1.9 - 3.5 g/dL    A-G Ratio 1.1 g/dL   ESTIMATED GFR   Result Value Ref Range    GFR (CKD-EPI) 120 >60 mL/min/1.73 m 2   COV-2, FLU A/B, AND RSV BY PCR (2-4 HOURS CEPHEID): Collect NP swab in VTM    Specimen: Respirate   Result Value Ref Range    SARS-CoV-2 Source NP Swab    POC BREATHALIZER   Result Value Ref Range    POC Breathalizer 0 0.00 - 0.01 Percent       Radiology  No orders to display         Medications:   New Prescriptions    No medications on file       Discharge Condition: Stable    Electronically signed by: Ryne Beard M.D., 10/26/2022 6:19 PM

## 2022-10-27 NOTE — ED NOTES
Assuming pt care. Pt sitting in bed, eating dinner, watching TV. Denies any needs at this time. Sitter in direct view. Resp even and unlabored, AAOX4.

## 2022-10-27 NOTE — ED NOTES
Belongings (bag x 4 from 2 lockers) given to EMS, double checked face sheets  EMS given bactroban x 2

## 2022-11-11 ENCOUNTER — APPOINTMENT (OUTPATIENT)
Dept: RADIOLOGY | Facility: MEDICAL CENTER | Age: 32
End: 2022-11-11
Attending: EMERGENCY MEDICINE
Payer: MEDICAID

## 2022-11-11 ENCOUNTER — HOSPITAL ENCOUNTER (EMERGENCY)
Facility: MEDICAL CENTER | Age: 32
End: 2022-11-11
Attending: EMERGENCY MEDICINE
Payer: MEDICAID

## 2022-11-11 VITALS
TEMPERATURE: 98.1 F | HEART RATE: 67 BPM | BODY MASS INDEX: 22.34 KG/M2 | WEIGHT: 126.1 LBS | SYSTOLIC BLOOD PRESSURE: 121 MMHG | RESPIRATION RATE: 16 BRPM | HEIGHT: 63 IN | OXYGEN SATURATION: 97 % | DIASTOLIC BLOOD PRESSURE: 76 MMHG

## 2022-11-11 DIAGNOSIS — S80.02XA CONTUSION OF LEFT KNEE, INITIAL ENCOUNTER: ICD-10-CM
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
## 2022-11-11 PROCEDURE — 99284 EMERGENCY DEPT VISIT MOD MDM: CPT

## 2022-11-11 PROCEDURE — 73562 X-RAY EXAM OF KNEE 3: CPT | Mod: LT

## 2022-11-11 ASSESSMENT — FIBROSIS 4 INDEX: FIB4 SCORE: 0.69

## 2022-11-11 NOTE — ED NOTES
Discharge instructions reviewed with patient.  Patient verbalizes understanding of follow up care, medication management and reasons to return to the ER.  Pt provided with bag for her belongings. Pt ambulatory to ER lobby.

## 2022-11-11 NOTE — ED PROVIDER NOTES
"ED Provider Note    Scribed for Noe Whitt M.D. by Manuela Price. 11/11/2022  5:29 AM    Primary care provider: Pcp Pt States None  Means of arrival: EMS  History obtained from: Patient  History limited by: None    CHIEF COMPLAINT  Chief Complaint   Patient presents with    Knee Pain     Medics reports that the pt ran up to EMS and stated that someone was chasing her and trying to kill her. While the pt was running up to EMS she fell and landed on here knee. No head injury, no other trauma noted. No thinners. The pt denies ETOH and drug usage.     Paranoid       HPI  Abel Angulo is a 32 y.o. female who presents to the Emergency Department via EMS for left knee pain onset this morning. The patient states that she tripped, fell, and hurt her left knee. She claims she was running because \"a large  man was chasing me.\" She adds that \"a lot of  people are after me. They want to kill me because I am white.\" She also experiences paranoia. Patient denies fever. No alleviating or exacerbating factors reported.    REVIEW OF SYSTEMS  Pertinent positives include knee pain and paranoia  Pertinent negatives include no fever.    All other systems reviewed and negative. See HPI for further details.     PAST MEDICAL HISTORY   has a past medical history of Acid reflux, Bipolar 1 disorder (HCC), Dental disorder, Depression, Migraine, Migraines, and Psychiatric problem.    SURGICAL HISTORY   has a past surgical history that includes other; esophagoscopy (4/5/2012); and gyn surgery.    SOCIAL HISTORY  Social History     Tobacco Use    Smoking status: Former     Types: Cigarettes    Smokeless tobacco: Never   Vaping Use    Vaping Use: Never used   Substance Use Topics    Alcohol use: Not Currently     Comment: quit October 2 2013    Drug use: Not Currently     Types: Intravenous, Inhaled     Comment: Marijuana      Social History     Substance and Sexual Activity   Drug Use Not " "Currently    Types: Intravenous, Inhaled    Comment: Marijuana     FAMILY HISTORY  Family History   Family history unknown: Yes     CURRENT MEDICATIONS  Home Medications       Reviewed by Wilton Harris R.N. (Registered Nurse) on 11/11/22 at 0456  Med List Status: Not Addressed     Medication Last Dose Status        Patient Grzegorz Taking any Medications                         ALLERGIES  Allergies   Allergen Reactions    Advil [Ibuprofen] Anaphylaxis     Tongue swells    Advil [Ibuprofen]      Tongue swelling    Benadryl Allergy Rash     Rash      Benadryl Allergy      Rash all over    Diphenhydramine      Other reaction(s): Rash, Unspecified     PHYSICAL EXAM  VITAL SIGNS: BP (!) 119/95   Pulse 86   Temp 36.6 °C (97.8 °F) (Tympanic)   Resp 18   Ht 1.6 m (5' 3\")   Wt 57.2 kg (126 lb 1.7 oz)   LMP  (LMP Unknown)   SpO2 98%   BMI 22.34 kg/m²   Nursing note and vitals reviewed.  Constitutional: Well-developed and well-nourished. Paranoid.  HENT: Head is normocephalic and atraumatic. Oropharynx is clear and moist without exudate or erythema.   Eyes: Pupils are equal, round, and reactive to light. Conjunctiva are normal.   Cardiovascular: Normal rate and regular rhythm. No murmur heard. Normal radial pulses.  Pulmonary/Chest: Breath sounds normal. No wheezes or rales.   Abdominal: Soft and non-tender. No distention    Musculoskeletal: Extremities exhibit normal range of motion without edema or tenderness.   Neurological: Awake, alert and oriented to person, place, and time. No focal deficits noted. Increased psychomotor agitation consistent with methamphetamine intoxication.   Skin: Skin is warm and dry. No rash. Bruising about left knee  Psychiatric: Normal mood and affect. Appropriate for clinical situation.  Says that they are a bunch of black people after her.  Does not seem to be responding to internal stimuli.  No suicidal or homicidal ideation.    DIAGNOSTIC STUDIES / PROCEDURES    RADIOLOGY  DX-KNEE 3 VIEWS " LEFT   Final Result      No radiographic evidence of acute traumatic injury.        The radiologist's interpretation of all radiological studies have been reviewed by me.    COURSE & MEDICAL DECISION MAKING  Nursing notes, VS, PMSFHx reviewed in chart.     5:29 AM - Patient seen and examined at bedside. I suspect methamphetamine usage, will treat accordingly. Discussed plan of care, including an x-ray of her knee. Patient verbalizes understanding and agreement to this plan of care. Ordered DX-Knee to evaluate her symptoms. The differential diagnoses include but are not limited to: Likely contusion, will evaluate for bony injury.    6:31 AM - Patient's radiology results are not evident of acute traumatic injury.    The patient will return for new or worsening symptoms and is stable at the time of discharge.    The patient is referred to a primary physician for blood pressure management, diabetic screening, and for all other preventative health concerns.    DISPOSITION:  Patient will be discharged home in stable condition.    FOLLOW UP:  [facility], Emergency Dept  [address] [facility] [facility] [id number] [phone]    If symptoms worsen    [address] [address]  [phone]  Schedule an appointment as soon as possible for a visit     FINAL IMPRESSION  1. Contusion of left knee, initial encounter    2. Fall, initial encounter       IManuela (Remi), am scribing for, and in the presence of, Noe Whitt M.D..    Electronically signed by: Manuela Price (Remi), 11/11/2022    INoe M.D. personally performed the services described in this documentation, as scribed by Manuela Price in my presence, and it is both accurate and complete.    The note accurately reflects work and decisions made by me.  Noe Whitt M.D.  11/11/2022  10:35 AM

## 2022-11-11 NOTE — ED TRIAGE NOTES
"Chief Complaint   Patient presents with    Knee Pain     Medics reports that the pt ran up to EMS and stated that someone was chasing her and trying to kill her. While the pt was running up to EMS she fell and landed on here knee. No head injury, no other trauma noted. No thinners. The pt denies ETOH and drug usage.     Paranoid       Pt wheelchair to triage. Pt A&Ox4, for the above complaint. The pt tearful and hypervigilant in traige     Pt to lobby . Pt educated on alerting staff in changes to condition. Pt verbalized understanding.     BP (!) 119/95   Pulse 86   Temp 36.6 °C (97.8 °F) (Tympanic)   Resp 18   Ht 1.6 m (5' 3\")   Wt 57.2 kg (126 lb 1.7 oz)   LMP  (LMP Unknown)   SpO2 98%   BMI 22.34 kg/m²     "

## 2023-01-30 ENCOUNTER — HOSPITAL ENCOUNTER (EMERGENCY)
Facility: MEDICAL CENTER | Age: 33
End: 2023-01-30
Attending: EMERGENCY MEDICINE
Payer: COMMERCIAL

## 2023-01-30 VITALS
DIASTOLIC BLOOD PRESSURE: 100 MMHG | HEART RATE: 100 BPM | SYSTOLIC BLOOD PRESSURE: 134 MMHG | HEIGHT: 63 IN | RESPIRATION RATE: 16 BRPM | BODY MASS INDEX: 22.15 KG/M2 | OXYGEN SATURATION: 100 % | WEIGHT: 125 LBS | TEMPERATURE: 97.8 F

## 2023-01-30 DIAGNOSIS — F15.10 METHAMPHETAMINE ABUSE (HCC): ICD-10-CM

## 2023-01-30 DIAGNOSIS — F22 PARANOID (HCC): ICD-10-CM

## 2023-01-30 DIAGNOSIS — N39.0 ACUTE UTI: ICD-10-CM

## 2023-01-30 LAB
APPEARANCE UR: CLEAR
BACTERIA #/AREA URNS HPF: ABNORMAL /HPF
BILIRUB UR QL STRIP.AUTO: NEGATIVE
COLOR UR: YELLOW
EPI CELLS #/AREA URNS HPF: ABNORMAL /HPF
GLUCOSE UR STRIP.AUTO-MCNC: NEGATIVE MG/DL
HCG UR QL: NEGATIVE
HYALINE CASTS #/AREA URNS LPF: ABNORMAL /LPF
KETONES UR STRIP.AUTO-MCNC: 40 MG/DL
LEUKOCYTE ESTERASE UR QL STRIP.AUTO: NEGATIVE
MICRO URNS: ABNORMAL
NITRITE UR QL STRIP.AUTO: NEGATIVE
PH UR STRIP.AUTO: 5 [PH] (ref 5–8)
POC BREATHALIZER: 0 PERCENT (ref 0–0.01)
PROT UR QL STRIP: NEGATIVE MG/DL
RBC # URNS HPF: ABNORMAL /HPF
RBC UR QL AUTO: ABNORMAL
SP GR UR STRIP.AUTO: 1.03
UROBILINOGEN UR STRIP.AUTO-MCNC: 0.2 MG/DL
WBC #/AREA URNS HPF: ABNORMAL /HPF

## 2023-01-30 PROCEDURE — 99284 EMERGENCY DEPT VISIT MOD MDM: CPT

## 2023-01-30 PROCEDURE — 81001 URINALYSIS AUTO W/SCOPE: CPT

## 2023-01-30 PROCEDURE — 302970 POC BREATHALIZER: Performed by: EMERGENCY MEDICINE

## 2023-01-30 PROCEDURE — 81025 URINE PREGNANCY TEST: CPT

## 2023-01-30 PROCEDURE — 90791 PSYCH DIAGNOSTIC EVALUATION: CPT

## 2023-01-30 RX ORDER — SULFAMETHOXAZOLE AND TRIMETHOPRIM 800; 160 MG/1; MG/1
1 TABLET ORAL 2 TIMES DAILY
Qty: 6 TABLET | Refills: 0 | Status: ACTIVE | OUTPATIENT
Start: 2023-01-30 | End: 2023-02-02

## 2023-01-30 ASSESSMENT — FIBROSIS 4 INDEX: FIB4 SCORE: 0.65

## 2023-01-30 ASSESSMENT — LIFESTYLE VARIABLES: DO YOU DRINK ALCOHOL: NO

## 2023-01-30 NOTE — ED NOTES
Pt complaining of severe pain with urination and pain all around perineal area. RN did have pt change into gown. Redness present to groin and inner upper legs. Appears to be a friction rash. UA collected. Dr. Wheeler aware of pt complaint. Zinc ointment provided for patient comfort. .

## 2023-01-30 NOTE — CONSULTS
[facility]   TRIAGE ASSESSMENT    Name: Abel Angulo  MRN: [id number]  : 1990  Age: 32 y.o.  Date of assessment: 2023  PCP: Pcp Pt States None  Persons in attendance: Patient  Patient Location: [facility]    CHIEF COMPLAINT/PRESENTING ISSUE (as stated by Patient): Pt states that she is being chased by someone that wants to kill her. Pt has been at [facility] twice 23 and one other ED visit at [facility] today. She has consistently denied suicidal ideation throughout the four visits. Pt states she needs to get back on her psych meds. Past history of Anxiety, bipolar d/o, depression, PTSD and schizoaffective disorder. Educated her about resources available in the community that are available to her. Directed her to [facility] and [facility]'s walk-in med clinics. Pt provided with [facility] transport information as well as her Medicaid number. Pt may present to [facility] for voluntary admission, as she does not meet criteria for a legal hold at this time.   Chief Complaint   Patient presents with    Psych Eval     Pt reports meth use yesterday. Pt now states people are discriminating against her and the cartel is after her. Pt thinks people are trying to harm her.         CURRENT LIVING SITUATION/SOCIAL SUPPORT/FINANCIAL RESOURCES: Pt is homeless, she is  but  from her spouse.     BEHAVIORAL HEALTH/SUBSTANCE USE TREATMENT HISTORY  Does patient/parent report a history of prior behavioral health/substance use treatment for patient?   Dates Level of Care Facilty/Provider Diagnosis/Problem Medications   10/25/22 Inpt [facility] Schizoaffective d/o Prozac, Prazosin, Risperdal, depakote, melatonin   2018 inpt [facility] SI, Depression, Bipolar D/O  Lithium 600 mg PO BID, Seroquel 25 mg PO daily, and Lamictal 1 PO PRN, last doses 2018 Sober living [facility] x 30 days Methamphetamine dependence    2017 inpt [facility]  SI, Depression, Bipolar D/O     [facility], Bipolar [facility]             SAFETY ASSESSMENT - SELF  Does patient acknowledge current or past symptoms of dangerousness to self or is previous history noted? no  Does parent/significant other report patient has current or past symptoms of dangerousness to self? N\A  Does presenting problem suggest symptoms of dangerousness to self? No    SAFETY ASSESSMENT - OTHERS  Does patient acknowledge current or past symptoms of aggressive behavior or risk to others or is previous history noted? no  Does parent/significant other report patient has current or past symptoms of aggressive behavior or risk to others?  N\A  Does presenting problem suggest symptoms of dangerousness to others? No    LEGAL HISTORY  Does patient acknowledge history of arrest/[facility]/penitentiary or is previous history noted? no    Crisis Safety Plan completed and copy given to patient? no    ABUSE/NEGLECT SCREENING  Does patient report feeling “unsafe” in his/her home, or afraid of anyone?  Yes, pt is paranoid that people are out to get her. She denies being abused.  Does patient report any history of physical, sexual, or emotional abuse?  no  Does parent or significant other report any of the above? N\A  Is there evidence of neglect by self?  yes  Is there evidence of neglect by a caregiver? no  Does the patient/parent report any history of CPS/APS/police involvement related to suspected abuse/neglect or domestic violence? no  Based on the information provided during the current assessment, is a mandated report of suspected abuse/neglect being made?  No    SUBSTANCE USE SCREENING  Yes:  Car all substances used in the past 30 days:      Last Use Amount   []   Alcohol     []   Marijuana     []   Heroin     []   Prescription Opioids  (used without prescription, for    recreation, or in excess of prescribed amount)     []   Other Prescription  (used without prescription, for    recreation, or in excess  "of prescribed amount)     []   Cocaine      [x]   Methamphetamine     []   \"\" drugs (ectasy, MDMA)     []   Other substances        UDS results: pending  Breathalyzer results: 0.0    What consequences does the patient associate with any of the above substance use and or addictive behaviors? Other: Health, financial, family        MENTAL STATUS   Participation: Active verbal participation and Resistant  Grooming: Disheveled  Orientation: Alert and Fully Oriented  Behavior: Agitated  Eye contact: Good  Mood: Angry and Irritable  Affect: Angry  Thought process: Perseveration  Thought content: Evidence of delusion and Paranoia  Speech: Loud and Rapid  Perception: Within normal limits  Memory:  Poor memory for chronology of events  Insight: Poor, continues drug use, denies that it is a problem that is effecting her mental health.  Judgment:  Adequate, able to present to the ED for services.  Other:    Collateral information:    Source:  [] Significant other present in person:   [] Significant other by telephone  [] [facility]   [] [facility]  [x] [facility]  [] Other:     [] Unable to complete full assessment due to:  [] Acute intoxication  [] Patient declined to participate/engage  [] Patient verbally unresponsive  [] Significant cognitive deficits  [] Significant perceptual distortions or behavioral disorganization  [] Other:      CLINICAL IMPRESSIONS:  Primary:  Methamphetamine use disorder  Secondary:  underlying psychiatric disorder       IDENTIFIED NEEDS/PLAN:  [Trigger DISPOSITION list for any items marked]    []  Imminent safety risk - self [] Imminent safety risk - others   []  Acute substance withdrawal [x]  Psychosis/Impaired reality testing   []  Mood/anxiety [x]  Substance use/Addictive behavior   []  Maladaptive behaviro []  Parent/child conflict   []  Family/Couples conflict []  Biomedical   []  Housing []  Financial   []   Legal  Occupational/Educational   []  " Domestic violence []  Other:     Recommended Plan of Care:  Refer to [facility], [facility], [facility], and [facility]  *[facility] may not be utilized for moderate or high risk patients    Has the Recommended Plan of Care/Level of Observation been reviewed with the patient's assigned nurse? yes    Does patient/parent or guardian express agreement with the above plan? no     Referral appointment(s) scheduled? Pt to present to walk-in clinic    Alert team only:   I have discussed findings and recommendations with Dr. Wheeler who is in agreement with these recommendations.     Referral information sent to the following outpatient community providers : [facility] outpatient clinic    Referral information sent to the following inpatient community providers :    If applicable : Referred  to  Alert Team for legal hold follow up at (time): ADRIEL Granados R.N.  1/30/2023

## 2023-01-30 NOTE — ED NOTES
Pt has been encouraged to use calamine cream. Educated multiple times that it is for external use only.

## 2023-01-30 NOTE — ED TRIAGE NOTES
"Chief Complaint   Patient presents with    Psych Eval     Pt reports meth use yesterday. Pt now states people are discriminating against her and the cartel is after her. Pt thinks people are trying to harm her.      /85   Pulse 99   Temp 36.4 °C (97.6 °F) (Temporal)   Resp 18   Ht 1.6 m (5' 3\")   Wt 56.7 kg (125 lb)   SpO2 96%   BMI 22.14 kg/m²     Pt tangential in triage.   "

## 2023-01-30 NOTE — ED NOTES
Pt has been provided written and verbal education on UTI and meth use. She has been educated on where to obtained prescribed antibiotics. She verbalized understanding. She is ambulatory to lobby on DC will all her personal belonging.

## 2023-01-30 NOTE — ED PROVIDER NOTES
ED Provider Note    CHIEF COMPLAINT  Chief Complaint   Patient presents with    Psych Eval     Pt reports meth use yesterday. Pt now states people are discriminating against her and the cartel is after her. Pt thinks people are trying to harm her.        EXTERNAL RECORDS REVIEWED  Outpatient Notes she was in our ER November 11, 2022 and at that time was paranoid that someone was going to kill her    HPI/ROS  LIMITATION TO HISTORY   Select: : None  OUTSIDE HISTORIAN(S):  None    Skynigel Ifrah Angulo is a 32 y.o. female who presents with past medical history significant for bipolar 1 disorder, depression, admits to using methamphetamine, last use yesterday, she presents saying that someone wants to kill her and that she wants to harm her self.  She denies any fever cough or other medical symptoms.  She denies chest pain.    PAST MEDICAL HISTORY   has a past medical history of Acid reflux, Bipolar 1 disorder (HCC), Dental disorder, Depression, Migraine, Migraines, and Psychiatric problem.    SURGICAL HISTORY   has a past surgical history that includes other; esophagoscopy (4/5/2012); and gyn surgery.    FAMILY HISTORY  Family History   Family history unknown: Yes       SOCIAL HISTORY  Social History     Tobacco Use    Smoking status: Former     Types: Cigarettes    Smokeless tobacco: Never   Vaping Use    Vaping Use: Never used   Substance and Sexual Activity    Alcohol use: Not Currently     Comment: quit October 2 2013    Drug use: Yes     Types: Intravenous, Inhaled     Comment: Marijuana, meth    Sexual activity: Never     Partners: Male     Birth control/protection: Surgical       CURRENT MEDICATIONS  Home Medications       Reviewed by Tracee Leonard R.N. (Registered Nurse) on 01/30/23 at 1028  Med List Status: Partial     Medication Last Dose Status        Patient Grzegorz Taking any Medications                           ALLERGIES  Allergies   Allergen Reactions    Advil [Ibuprofen] Anaphylaxis     Tongue  "swells    Advil [Ibuprofen]      Tongue swelling    Benadryl Allergy Rash     Rash      Benadryl Allergy      Rash all over    Diphenhydramine      Other reaction(s): Rash, Unspecified       PHYSICAL EXAM  VITAL SIGNS: BP (!) 134/100   Pulse 100   Temp 36.6 °C (97.8 °F) (Temporal)   Resp 16   Ht 1.6 m (5' 3\")   Wt 56.7 kg (125 lb)   SpO2 100%   BMI 22.14 kg/m²    Constitutional: Alert.  HENT: No signs of trauma, Bilateral external ears normal, Nose normal.   Eyes: Pupils are equal and reactive, Conjunctiva normal, Non-icteric.   Neck: Normal range of motion, No tenderness, Supple, No stridor.   Lymphatic: No lymphadenopathy noted.   Cardiovascular: Regular rate and rhythm, no murmurs.   Thorax & Lungs: Normal breath sounds, No respiratory distress, No wheezing, No chest tenderness.   Abdomen: Bowel sounds normal, Soft, No tenderness, No peritoneal signs, No masses, No pulsatile masses.   Skin: Warm, Dry, No erythema, No rash.   Back: No bony tenderness, No CVA tenderness.   Extremities: Intact distal pulses, No edema, No tenderness, No cyanosis.  Musculoskeletal: Good range of motion in all major joints. No tenderness to palpation or major deformities noted.   Neurologic: Alert , Normal motor function, Normal sensory function, No focal deficits noted.   Psychiatric: Affect normal, Judgment normal, Mood normal.       DIAGNOSTIC STUDIES / PROCEDURES    LABS  Labs Reviewed   URINALYSIS - Abnormal; Notable for the following components:       Result Value    Ketones 40 (*)     Occult Blood Small (*)     All other components within normal limits   URINE MICROSCOPIC (W/UA) - Abnormal; Notable for the following components:    WBC 10-20 (*)     RBC 5-10 (*)     Bacteria Few (*)     All other components within normal limits   POC BREATHALIZER - Normal   HCG QUALITATIVE UR             COURSE & MEDICAL DECISION MAKING    ED Observation Status? Yes; I am placing the patient in to an observation status due to a diagnostic " uncertainty as well as therapeutic intensity. Patient placed in observation status at11:40 AM, 1/30/2023.     Observation plan is as follows: The patient will need to be assessed by the [facility] behavioral health team.    We agree the patient needs resources for amphetamine use and is savita for safety.    Upon Reevaluation, the patient's condition has: Improved; and will be discharged.    Patient now reports that she is having burning with urination, her urine is positive for infection and will be prescribed antibiotics.    Patient discharged from ED Observation status at 2:00PM (Time) 1/30/23 (Date).     INITIAL ASSESSMENT, COURSE AND PLAN  Care Narrative: The patient presents with history of methamphetamine abuse and being paranoid.  Her breathalyzer is 0.  Consult was put in for behavioral health.  Urine drug screen was ordered although the patient admits to using methamphetamine.          ADDITIONAL PROBLEM LIST  Paranoid, methamphetamine abuse      Discussion of management with other [facility] or appropriate source(s): Behavioral Health behavioral health is assessed the patient and we agree she is safe to be discharged.      Escalation of care considered, and ultimately not performed:acute inpatient care management, however at this time, the patient is most appropriate for outpatient management after observation the patient does not require hospitalization.    Barriers to care at this time, including but not limited to:  Patient has bipolar 1 disorder .     Decision tools and prescription drugs considered including, but not limited to: Antibiotics Bactrim for UTI .  The patient will return for new or worsening symptoms and is stable at the time of discharge.    The patient is referred to a primary physician for blood pressure management, diabetic screening, and for all other preventative health concerns.        DISPOSITION:  Patient will be discharged home in stable condition.    FOLLOW UP:  [facility]  [facility], Emergency Dept  [address] [id number] [phone]  Follow up  If symptoms worsen    [facility]  [address] [facility] [address]  [phone]  Follow up  Call to establish a primary care doctor if you do not already have a primary care doctor      OUTPATIENT MEDICATIONS:  Discharge Medication List as of 1/30/2023  2:03 PM        START taking these medications    Details   sulfamethoxazole-trimethoprim (BACTRIM DS) 800-160 MG tablet Take 1 Tablet by mouth 2 times a day for 3 days., Disp-6 Tablet, R-0, Normal               FINAL DIAGNOSIS  1. Methamphetamine abuse (HCC)    2. Paranoid (HCC)    3. Acute UTI           Electronically signed by: Wilton Wheeler M.D., 1/30/2023 11:26 AM

## 2023-01-31 ENCOUNTER — HOSPITAL ENCOUNTER (EMERGENCY)
Facility: MEDICAL CENTER | Age: 33
End: 2023-02-01
Attending: EMERGENCY MEDICINE
Payer: COMMERCIAL

## 2023-01-31 DIAGNOSIS — F22 PARANOID DELUSION (HCC): ICD-10-CM

## 2023-01-31 DIAGNOSIS — F15.10 METHAMPHETAMINE ABUSE (HCC): ICD-10-CM

## 2023-01-31 DIAGNOSIS — R45.851 SUICIDAL IDEATION: ICD-10-CM

## 2023-01-31 LAB
ALBUMIN SERPL BCP-MCNC: 4.5 G/DL (ref 3.2–4.9)
ALBUMIN/GLOB SERPL: 1.4 G/DL
ALP SERPL-CCNC: 52 U/L (ref 30–99)
ALT SERPL-CCNC: 26 U/L (ref 2–50)
AMORPH CRY #/AREA URNS HPF: PRESENT /HPF
AMPHET UR QL SCN: POSITIVE
ANION GAP SERPL CALC-SCNC: 15 MMOL/L (ref 7–16)
APPEARANCE UR: CLEAR
AST SERPL-CCNC: 31 U/L (ref 12–45)
BACTERIA #/AREA URNS HPF: ABNORMAL /HPF
BARBITURATES UR QL SCN: NEGATIVE
BASOPHILS # BLD AUTO: 0.7 % (ref 0–1.8)
BASOPHILS # BLD: 0.04 K/UL (ref 0–0.12)
BENZODIAZ UR QL SCN: NEGATIVE
BILIRUB SERPL-MCNC: 0.5 MG/DL (ref 0.1–1.5)
BILIRUB UR QL STRIP.AUTO: NEGATIVE
BUN SERPL-MCNC: 11 MG/DL (ref 8–22)
BZE UR QL SCN: NEGATIVE
CALCIUM ALBUM COR SERPL-MCNC: 8.9 MG/DL (ref 8.5–10.5)
CALCIUM SERPL-MCNC: 9.3 MG/DL (ref 8.5–10.5)
CANNABINOIDS UR QL SCN: NEGATIVE
CHLORIDE SERPL-SCNC: 100 MMOL/L (ref 96–112)
CO2 SERPL-SCNC: 21 MMOL/L (ref 20–33)
COLOR UR: YELLOW
CREAT SERPL-MCNC: 0.64 MG/DL (ref 0.5–1.4)
EOSINOPHIL # BLD AUTO: 0.23 K/UL (ref 0–0.51)
EOSINOPHIL NFR BLD: 4 % (ref 0–6.9)
EPI CELLS #/AREA URNS HPF: ABNORMAL /HPF
ERYTHROCYTE [DISTWIDTH] IN BLOOD BY AUTOMATED COUNT: 49.7 FL (ref 35.9–50)
GFR SERPLBLD CREATININE-BSD FMLA CKD-EPI: 119 ML/MIN/1.73 M 2
GLOBULIN SER CALC-MCNC: 3.3 G/DL (ref 1.9–3.5)
GLUCOSE SERPL-MCNC: 123 MG/DL (ref 65–99)
GLUCOSE UR STRIP.AUTO-MCNC: NEGATIVE MG/DL
HCG UR QL: NEGATIVE
HCT VFR BLD AUTO: 37.5 % (ref 37–47)
HGB BLD-MCNC: 12.3 G/DL (ref 12–16)
HIV 1+2 AB+HIV1 P24 AG SERPL QL IA: NORMAL
HYALINE CASTS #/AREA URNS LPF: ABNORMAL /LPF
IMM GRANULOCYTES # BLD AUTO: 0.02 K/UL (ref 0–0.11)
IMM GRANULOCYTES NFR BLD AUTO: 0.3 % (ref 0–0.9)
KETONES UR STRIP.AUTO-MCNC: ABNORMAL MG/DL
LEUKOCYTE ESTERASE UR QL STRIP.AUTO: ABNORMAL
LYMPHOCYTES # BLD AUTO: 1.99 K/UL (ref 1–4.8)
LYMPHOCYTES NFR BLD: 34.8 % (ref 22–41)
MCH RBC QN AUTO: 28.7 PG (ref 27–33)
MCHC RBC AUTO-ENTMCNC: 32.8 G/DL (ref 33.6–35)
MCV RBC AUTO: 87.6 FL (ref 81.4–97.8)
METHADONE UR QL SCN: NEGATIVE
MICRO URNS: ABNORMAL
MONOCYTES # BLD AUTO: 0.55 K/UL (ref 0–0.85)
MONOCYTES NFR BLD AUTO: 9.6 % (ref 0–13.4)
NEUTROPHILS # BLD AUTO: 2.89 K/UL (ref 2–7.15)
NEUTROPHILS NFR BLD: 50.6 % (ref 44–72)
NITRITE UR QL STRIP.AUTO: NEGATIVE
NRBC # BLD AUTO: 0 K/UL
NRBC BLD-RTO: 0 /100 WBC
OPIATES UR QL SCN: NEGATIVE
OXYCODONE UR QL SCN: NEGATIVE
PCP UR QL SCN: NEGATIVE
PH UR STRIP.AUTO: 5.5 [PH] (ref 5–8)
PLATELET # BLD AUTO: 224 K/UL (ref 164–446)
PMV BLD AUTO: 9.2 FL (ref 9–12.9)
POC BREATHALIZER: 0 PERCENT (ref 0–0.01)
POTASSIUM SERPL-SCNC: 3.8 MMOL/L (ref 3.6–5.5)
PROPOXYPH UR QL SCN: NEGATIVE
PROT SERPL-MCNC: 7.8 G/DL (ref 6–8.2)
PROT UR QL STRIP: NEGATIVE MG/DL
RBC # BLD AUTO: 4.28 M/UL (ref 4.2–5.4)
RBC # URNS HPF: ABNORMAL /HPF
RBC UR QL AUTO: NEGATIVE
SARS-COV+SARS-COV-2 AG RESP QL IA.RAPID: NOTDETECTED
SODIUM SERPL-SCNC: 136 MMOL/L (ref 135–145)
SP GR UR STRIP.AUTO: 1.02
SPECIMEN SOURCE: NORMAL
T PALLIDUM AB SER QL IA: REACTIVE
UROBILINOGEN UR STRIP.AUTO-MCNC: 0.2 MG/DL
WBC # BLD AUTO: 5.7 K/UL (ref 4.8–10.8)
WBC #/AREA URNS HPF: ABNORMAL /HPF

## 2023-01-31 PROCEDURE — 86592 SYPHILIS TEST NON-TREP QUAL: CPT

## 2023-01-31 PROCEDURE — 81025 URINE PREGNANCY TEST: CPT

## 2023-01-31 PROCEDURE — 80307 DRUG TEST PRSMV CHEM ANLYZR: CPT

## 2023-01-31 PROCEDURE — 85025 COMPLETE CBC W/AUTO DIFF WBC: CPT

## 2023-01-31 PROCEDURE — 87389 HIV-1 AG W/HIV-1&-2 AB AG IA: CPT

## 2023-01-31 PROCEDURE — 700102 HCHG RX REV CODE 250 W/ 637 OVERRIDE(OP): Performed by: EMERGENCY MEDICINE

## 2023-01-31 PROCEDURE — 81001 URINALYSIS AUTO W/SCOPE: CPT

## 2023-01-31 PROCEDURE — 87426 SARSCOV CORONAVIRUS AG IA: CPT

## 2023-01-31 PROCEDURE — 302970 POC BREATHALIZER: Performed by: EMERGENCY MEDICINE

## 2023-01-31 PROCEDURE — A9270 NON-COVERED ITEM OR SERVICE: HCPCS | Performed by: EMERGENCY MEDICINE

## 2023-01-31 PROCEDURE — 80053 COMPREHEN METABOLIC PANEL: CPT

## 2023-01-31 PROCEDURE — 99285 EMERGENCY DEPT VISIT HI MDM: CPT

## 2023-01-31 PROCEDURE — 86780 TREPONEMA PALLIDUM: CPT

## 2023-01-31 PROCEDURE — 36415 COLL VENOUS BLD VENIPUNCTURE: CPT

## 2023-01-31 RX ORDER — SULFAMETHOXAZOLE AND TRIMETHOPRIM 800; 160 MG/1; MG/1
1 TABLET ORAL EVERY 12 HOURS
Status: DISCONTINUED | OUTPATIENT
Start: 2023-02-01 | End: 2023-02-01 | Stop reason: HOSPADM

## 2023-01-31 RX ORDER — SULFAMETHOXAZOLE AND TRIMETHOPRIM 800; 160 MG/1; MG/1
1 TABLET ORAL ONCE
Status: COMPLETED | OUTPATIENT
Start: 2023-01-31 | End: 2023-01-31

## 2023-01-31 RX ADMIN — SULFAMETHOXAZOLE AND TRIMETHOPRIM 1 TABLET: 800; 160 TABLET ORAL at 17:26

## 2023-01-31 ASSESSMENT — LIFESTYLE VARIABLES
TOTAL SCORE: 0
HAVE PEOPLE ANNOYED YOU BY CRITICIZING YOUR DRINKING: NO
ON A TYPICAL DAY WHEN YOU DRINK ALCOHOL HOW MANY DRINKS DO YOU HAVE: 0
EVER FELT BAD OR GUILTY ABOUT YOUR DRINKING: NO
EVER HAD A DRINK FIRST THING IN THE MORNING TO STEADY YOUR NERVES TO GET RID OF A HANGOVER: NO
HOW MANY TIMES IN THE PAST YEAR HAVE YOU HAD 5 OR MORE DRINKS IN A DAY: 0
TOTAL SCORE: 0
CONSUMPTION TOTAL: NEGATIVE
DO YOU DRINK ALCOHOL: NO
HAVE YOU EVER FELT YOU SHOULD CUT DOWN ON YOUR DRINKING: NO
AVERAGE NUMBER OF DAYS PER WEEK YOU HAVE A DRINK CONTAINING ALCOHOL: 0
TOTAL SCORE: 0

## 2023-01-31 ASSESSMENT — FIBROSIS 4 INDEX: FIB4 SCORE: 0.65

## 2023-01-31 NOTE — ED TRIAGE NOTES
"Chief Complaint   Patient presents with    Suicidal Ideation     Pt was sent over by ambulance from [facility] for SI. Pt reports that she just feels like she wants to die. Pt denies a plan at this time. Pt has had a previous attempt in the past by trying to drowned herself. Pt denies HI at this time.     Exposure to STD     Pt reports she's having dysuria and urgency. Pt states that she may have been exposed to an STD and would like to be checked.         Pt BIBA for above complaint. Pt reports that she has a rash on her vagina and would like to get tested for STDs. Pt placed on legal hold by [facility] prior to arrival. Pt calm and cooperative. [facility] told EMS that they have a bed available for pt once she is medically cleared.    /79   Pulse 79   Temp 37.1 °C (98.7 °F) (Oral)   Resp 18   Ht 1.6 m (5' 3\")   Wt 56.7 kg (125 lb)   SpO2 100%      "

## 2023-01-31 NOTE — ED PROVIDER NOTES
"  ER Provider Note    Scribed for David Liu D.O. by Rad Purdy. 1/31/2023  2:49 PM    Primary Care Provider: Pcp Pt States None    CHIEF COMPLAINT  Chief Complaint   Patient presents with    Suicidal Ideation     Pt was sent over by ambulance from [facility] for SI. Pt reports that she just feels like she wants to die. Pt denies a plan at this time. Pt has had a previous attempt in the past by trying to drowned herself. Pt denies HI at this time.     Exposure to STD     Pt reports she's having dysuria and urgency. Pt states that she may have been exposed to an STD and would like to be checked.      HPI/ROS    EXTERNAL RECORDS REVIEWED  External Note shows the patient presented with legal 2000 threatening to hurt herself.    Abel Angulo is a 32 y.o. female who presents to the ED for suicidal ideation onset prior to arrival. The patient has a history of depression and schizophrenia. She has not been on her medication for several months because she's unable to get them. She states she \"can't be out there\" because she does not feel safe. She says she tried to overdose on meth recently. She additionally has concerns for an STD due to dysuria. No alleviating or exacerbating factors reported.    PAST MEDICAL HISTORY  Past Medical History:   Diagnosis Date    Acid reflux     Bipolar 1 disorder (HCC)     Dental disorder     Depression     anxiety    Migraine     Migraines     Psychiatric problem     depression       SURGICAL HISTORY  Past Surgical History:   Procedure Laterality Date    ESOPHAGOSCOPY  4/5/2012    Performed by TULIO HOLLAND at SURGERY SAME DAY [facility]    GYN SURGERY      2 csection    OTHER      left breast implant       FAMILY HISTORY  Family History   Family history unknown: Yes       SOCIAL HISTORY   reports that she has quit smoking. Her smoking use included cigarettes. She has never used smokeless tobacco. She reports that she does not currently use alcohol. She reports current " "drug use. Drugs: Intravenous and Inhaled.    CURRENT MEDICATIONS  Previous Medications    SULFAMETHOXAZOLE-TRIMETHOPRIM (BACTRIM DS) 800-160 MG TABLET    Take 1 Tablet by mouth 2 times a day for 3 days.       ALLERGIES  Advil [ibuprofen], Advil [ibuprofen], Benadryl allergy, Benadryl allergy, and Diphenhydramine    PHYSICAL EXAM  /79   Pulse 79   Temp 37.1 °C (98.7 °F) (Oral)   Resp 18   Ht 1.6 m (5' 3\")   Wt 56.7 kg (125 lb)   SpO2 100%   BMI 22.14 kg/m²   General: No acute distress.  HENT: Normocephalic, Mucus membranes are moist.   Chest: Lungs have even and unlabored respirations, Clear to auscultation.   Cardiovascular: Regular rate and rhythm, No peripheral cyanosis.  Abdomen: Non distended.  Neuro: Awake, Conversive, Able to relay recent events.  Psychiatric: Patient is mildly hyperactive, Speaking rapidly, States \"people are after her and she doesn't feel safe, she can't take it anymore,\" tried to overdose on methamphetamine.    DIAGNOSTIC STUDIES & PROCEDURES    Labs:   Results for orders placed or performed during the hospital encounter of 01/31/23   Urine Drug Screen   Result Value Ref Range    Amphetamines Urine Positive (A) Negative    Barbiturates Negative Negative    Benzodiazepines Negative Negative    Cocaine Metabolite Negative Negative    Methadone Negative Negative    Opiates Negative Negative    Oxycodone Negative Negative    Phencyclidine -Pcp Negative Negative    Propoxyphene Negative Negative    Cannabinoid Metab Negative Negative   HIV AG/AB Combo Assay Screening   Result Value Ref Range    HIV Ag/Ab Combo Assay Non-Reactive Non Reactive   URINALYSIS (UA)    Specimen: Urine   Result Value Ref Range    Color Yellow     Character Clear     Specific Gravity 1.016 <1.035    Ph 5.5 5.0 - 8.0    Glucose Negative Negative mg/dL    Ketones Trace (A) Negative mg/dL    Protein Negative Negative mg/dL    Bilirubin Negative Negative    Urobilinogen, Urine 0.2 Negative    Nitrite Negative " Negative    Leukocyte Esterase Moderate (A) Negative    Occult Blood Negative Negative    Micro Urine Req Microscopic    HCG QUALITATIVE UR (Lab)   Result Value Ref Range    Beta-Hcg Urine Negative Negative   URINE MICROSCOPIC (W/UA)   Result Value Ref Range    WBC 20-50 (A) /hpf    RBC 0-2 /hpf    Bacteria Few (A) None /hpf    Epithelial Cells Few /hpf    Amorphous Crystal Present /hpf    Hyaline Cast 0-2 /lpf   CBC WITH DIFFERENTIAL   Result Value Ref Range    WBC 5.7 4.8 - 10.8 K/uL    RBC 4.28 4.20 - 5.40 M/uL    Hemoglobin 12.3 12.0 - 16.0 g/dL    Hematocrit 37.5 37.0 - 47.0 %    MCV 87.6 81.4 - 97.8 fL    MCH 28.7 27.0 - 33.0 pg    MCHC 32.8 (L) 33.6 - 35.0 g/dL    RDW 49.7 35.9 - 50.0 fL    Platelet Count 224 164 - 446 K/uL    MPV 9.2 9.0 - 12.9 fL    Neutrophils-Polys 50.60 44.00 - 72.00 %    Lymphocytes 34.80 22.00 - 41.00 %    Monocytes 9.60 0.00 - 13.40 %    Eosinophils 4.00 0.00 - 6.90 %    Basophils 0.70 0.00 - 1.80 %    Immature Granulocytes 0.30 0.00 - 0.90 %    Nucleated RBC 0.00 /100 WBC    Neutrophils (Absolute) 2.89 2.00 - 7.15 K/uL    Lymphs (Absolute) 1.99 1.00 - 4.80 K/uL    Monos (Absolute) 0.55 0.00 - 0.85 K/uL    Eos (Absolute) 0.23 0.00 - 0.51 K/uL    Baso (Absolute) 0.04 0.00 - 0.12 K/uL    Immature Granulocytes (abs) 0.02 0.00 - 0.11 K/uL    NRBC (Absolute) 0.00 K/uL   COMP METABOLIC PANEL   Result Value Ref Range    Sodium 136 135 - 145 mmol/L    Potassium 3.8 3.6 - 5.5 mmol/L    Chloride 100 96 - 112 mmol/L    Co2 21 20 - 33 mmol/L    Anion Gap 15.0 7.0 - 16.0    Glucose 123 (H) 65 - 99 mg/dL    Bun 11 8 - 22 mg/dL    Creatinine 0.64 0.50 - 1.40 mg/dL    Calcium 9.3 8.5 - 10.5 mg/dL    AST(SGOT) 31 12 - 45 U/L    ALT(SGPT) 26 2 - 50 U/L    Alkaline Phosphatase 52 30 - 99 U/L    Total Bilirubin 0.5 0.1 - 1.5 mg/dL    Albumin 4.5 3.2 - 4.9 g/dL    Total Protein 7.8 6.0 - 8.2 g/dL    Globulin 3.3 1.9 - 3.5 g/dL    A-G Ratio 1.4 g/dL   SARS-COV Antigen ULI   Result Value Ref Range     SARS-CoV-2 Source Nasal Swab     SARS-COV ANTIGEN ULI NotDetected NotDetected   CORRECTED CALCIUM   Result Value Ref Range    Correct Calcium 8.9 8.5 - 10.5 mg/dL   ESTIMATED GFR   Result Value Ref Range    GFR (CKD-EPI) 119 >60 mL/min/1.73 m 2   POC BREATHALIZER   Result Value Ref Range    POC Breathalizer 0.00 0.00 - 0.01 Percent   All labs reviewed by me.    COURSE & MEDICAL DECISION MAKING    ED Observation Status? Yes; I am placing the patient in to an observation status due to a diagnostic uncertainty as well as therapeutic intensity. Patient placed in observation status at 2:59 PM, 1/31/2023.     Observation plan is as follows: Patient is suicidal. She has been using methamphetamine. Will observe in ED and have evaluation by alert team and reevaluate after.    INITIAL ASSESSMENT AND PLAN  Care Narrative: Patient has a history of schizophrenia and depression. She has been off of her medications for several months. Presents with mild paranoia and suicidal thoughts. She does use methamphetamines which I'm sure exacerbates these problems, especially the paranoia. She is sitting on her bed and eating crackers with sugar. She also has concerns of possible STD because she has burning with urination.      2:49 PM - Patient seen and evaluated at bedside. Discussed plan of care, including labs and consult with the alert team. Patient agrees to plan of care. Ordered UA, HCG Qual UR, Chlamydia/GC, Syphilis, HIV AG/AB, POC Breathalizer, urine Drug Screen, and Urine Microscopic w/ UA to evaluate.    5:40 PM - I spoke to the alert team who says the patient denies SI. She will be on legal hold and stay on ED Obs pending acceptance by psychiatric facility. SARS-COV Antigen, CBC w/ Diff, CMP, and UA ordered.    ADDITIONAL PROBLEM LIST AND DISPOSITION  Methamphetamine, Medical noncompliance, Dysuria               DISPOSITION AND DISCUSSIONS    Discussion of management with other QHP or appropriate source(s):  Alert Team         Barriers to care at this time, including but not limited to:  Homelessness, Substances abuse, Medical noncompliance .       DISPOSITION:  Patient will be held in emergency department until accepted by psychiatric facility remains on legal hold.    FINAL IMPRESSION   1. Methamphetamine abuse (HCC)    2. Suicidal ideation    3. Paranoid delusion (HCC)       Rad RUEDA (Scribe), am scribing for, and in the presence of, David Liu D.O..    Electronically signed by: Rad Purdy (Scribe), 1/31/2023    David RUEDA D.O. personally performed the services described in this documentation, as scribed by Rad Purdy in my presence, and it is both accurate and complete.    The note accurately reflects work and decisions made by me.  David Liu D.O.  1/31/2023  10:50 PM

## 2023-02-01 VITALS
BODY MASS INDEX: 22.15 KG/M2 | RESPIRATION RATE: 16 BRPM | HEART RATE: 84 BPM | HEIGHT: 63 IN | SYSTOLIC BLOOD PRESSURE: 118 MMHG | WEIGHT: 125 LBS | TEMPERATURE: 97.6 F | DIASTOLIC BLOOD PRESSURE: 76 MMHG | OXYGEN SATURATION: 97 %

## 2023-02-01 PROCEDURE — 700102 HCHG RX REV CODE 250 W/ 637 OVERRIDE(OP): Performed by: EMERGENCY MEDICINE

## 2023-02-01 PROCEDURE — A9270 NON-COVERED ITEM OR SERVICE: HCPCS | Performed by: EMERGENCY MEDICINE

## 2023-02-01 RX ADMIN — SULFAMETHOXAZOLE AND TRIMETHOPRIM 1 TABLET: 800; 160 TABLET ORAL at 07:06

## 2023-02-01 NOTE — DISCHARGE SUMMARY
"ED Observation Discharge Summary    Scribed for To Cotton M.d. by Zee Vega. 2023  8:08 AM    Patient:Abel Angulo  Patient : 1990  Patient MRN: [id number]  Patient PCP: Pcp Pt States None    Admit Date: 2023  Discharge Date and Time: 23 8:08 AM  Discharge Diagnosis: ***  Discharge Attending: Zee Vega  Discharge Service: ED Observation    ED Course  Abel is a 32 y.o. female who was evaluated at [facility] for suicidal ideations and methamphetamine use. She was placed into ED observation at 2:59 PM on 23. The patients nurse informed me they will be transferred to [facility] at 2:00 PM today.     6:47 AM - I was informed the patient got a pill stuck in her throat. I modified the patients diet to soft foods only.    Discharge Exam:  /60   Pulse 95   Temp 36.9 °C (98.4 °F)   Resp 18   Ht 1.6 m (5' 3\")   Wt 56.7 kg (125 lb)   SpO2 96%   BMI 22.14 kg/m² .    Constitutional: Awake and alert. Nontoxic  HENT:  Grossly normal  Eyes: Grossly normal  Neck: Normal range of motion  Cardiovascular: Normal heart rate   Thorax & Lungs: No respiratory distress  Abdomen: Nontender  Skin:  No pathologic rash.   Extremities: Well perfused  Psychiatric: Affect normal    Labs  Results for orders placed or performed during the hospital encounter of 23   Urine Drug Screen   Result Value Ref Range    Amphetamines Urine Positive (A) Negative    Barbiturates Negative Negative    Benzodiazepines Negative Negative    Cocaine Metabolite Negative Negative    Methadone Negative Negative    Opiates Negative Negative    Oxycodone Negative Negative    Phencyclidine -Pcp Negative Negative    Propoxyphene Negative Negative    Cannabinoid Metab Negative Negative   T.PALLIDUM AB JA (Syphilis)   Result Value Ref Range    Syphilis, Treponemal Qual Reactive (A) Non-Reactive   HIV AG/AB Combo Assay Screening   Result Value Ref Range    HIV Ag/Ab Combo Assay " Non-Reactive Non Reactive   URINALYSIS (UA)    Specimen: Urine   Result Value Ref Range    Color Yellow     Character Clear     Specific Gravity 1.016 <1.035    Ph 5.5 5.0 - 8.0    Glucose Negative Negative mg/dL    Ketones Trace (A) Negative mg/dL    Protein Negative Negative mg/dL    Bilirubin Negative Negative    Urobilinogen, Urine 0.2 Negative    Nitrite Negative Negative    Leukocyte Esterase Moderate (A) Negative    Occult Blood Negative Negative    Micro Urine Req Microscopic    HCG QUALITATIVE UR (Lab)   Result Value Ref Range    Beta-Hcg Urine Negative Negative   URINE MICROSCOPIC (W/UA)   Result Value Ref Range    WBC 20-50 (A) /hpf    RBC 0-2 /hpf    Bacteria Few (A) None /hpf    Epithelial Cells Few /hpf    Amorphous Crystal Present /hpf    Hyaline Cast 0-2 /lpf   CBC WITH DIFFERENTIAL   Result Value Ref Range    WBC 5.7 4.8 - 10.8 K/uL    RBC 4.28 4.20 - 5.40 M/uL    Hemoglobin 12.3 12.0 - 16.0 g/dL    Hematocrit 37.5 37.0 - 47.0 %    MCV 87.6 81.4 - 97.8 fL    MCH 28.7 27.0 - 33.0 pg    MCHC 32.8 (L) 33.6 - 35.0 g/dL    RDW 49.7 35.9 - 50.0 fL    Platelet Count 224 164 - 446 K/uL    MPV 9.2 9.0 - 12.9 fL    Neutrophils-Polys 50.60 44.00 - 72.00 %    Lymphocytes 34.80 22.00 - 41.00 %    Monocytes 9.60 0.00 - 13.40 %    Eosinophils 4.00 0.00 - 6.90 %    Basophils 0.70 0.00 - 1.80 %    Immature Granulocytes 0.30 0.00 - 0.90 %    Nucleated RBC 0.00 /100 WBC    Neutrophils (Absolute) 2.89 2.00 - 7.15 K/uL    Lymphs (Absolute) 1.99 1.00 - 4.80 K/uL    Monos (Absolute) 0.55 0.00 - 0.85 K/uL    Eos (Absolute) 0.23 0.00 - 0.51 K/uL    Baso (Absolute) 0.04 0.00 - 0.12 K/uL    Immature Granulocytes (abs) 0.02 0.00 - 0.11 K/uL    NRBC (Absolute) 0.00 K/uL   COMP METABOLIC PANEL   Result Value Ref Range    Sodium 136 135 - 145 mmol/L    Potassium 3.8 3.6 - 5.5 mmol/L    Chloride 100 96 - 112 mmol/L    Co2 21 20 - 33 mmol/L    Anion Gap 15.0 7.0 - 16.0    Glucose 123 (H) 65 - 99 mg/dL    Bun 11 8 - 22 mg/dL     Creatinine 0.64 0.50 - 1.40 mg/dL    Calcium 9.3 8.5 - 10.5 mg/dL    AST(SGOT) 31 12 - 45 U/L    ALT(SGPT) 26 2 - 50 U/L    Alkaline Phosphatase 52 30 - 99 U/L    Total Bilirubin 0.5 0.1 - 1.5 mg/dL    Albumin 4.5 3.2 - 4.9 g/dL    Total Protein 7.8 6.0 - 8.2 g/dL    Globulin 3.3 1.9 - 3.5 g/dL    A-G Ratio 1.4 g/dL   SARS-COV Antigen ULI   Result Value Ref Range    SARS-CoV-2 Source Nasal Swab     SARS-COV ANTIGEN ULI NotDetected NotDetected   CORRECTED CALCIUM   Result Value Ref Range    Correct Calcium 8.9 8.5 - 10.5 mg/dL   ESTIMATED GFR   Result Value Ref Range    GFR (CKD-EPI) 119 >60 mL/min/1.73 m 2   POC BREATHALIZER   Result Value Ref Range    POC Breathalizer 0.00 0.00 - 0.01 Percent     My final assessment includes ***  Upon Reevaluation, the patient's condition has: {Improved/Not Improved:86492}    Patient discharged from ED Observation status at 2:00 PM (Time) 02/01/23 (Date).     Total time spent on this ED Observation discharge encounter is > 30 Minutes     Zee RUEDA (Scribe), am scribing for, and in the presence of, To Cotton M.D..    Electronically signed by: Zee Vega (Devangibe), 2/1/2023    To RUEDA M.D. personally performed the services described in this documentation, as scribed by Zee Vega in my presence, and it is both accurate and complete.      {ERP Attestation (ERP ONLY):200109}

## 2023-02-01 NOTE — DISCHARGE PLANNING
[facility] ALERT TEAM DISCHARGE PLANNING NOTE    Date:  1/31/23  Patient Name:  Abel Angulo - 32 y.o. - Discharge Planning  MRN:  [id number]   YOB: 1990  ADMISSION DATE:  1/31/2023      Writer forwarded referral packet for inpatient psychiatric care to the following community providers:  [facility], [facility], [facility] via [facility]   Items included in the referral packet:   __x___Face Sheet   __x___Pages 1 and 2 of completed legal hold   __x___Alert Team/Psych Assessment   _____H&P   __x___UDS   __x___Blood Alcohol   __x___Vital signs   __x___Pregnancy Test (if applicable)   __x___Medications List   _____Covid Screen

## 2023-02-01 NOTE — ED NOTES
Pt resting, airway patent, rr even and unlabored, equal chest rise and fall. NAD noted. 1:1 sitter continued.

## 2023-02-01 NOTE — ED NOTES
Pt sleeping in bed, equal chest rise and fall, airway patent, rr even and unlabored, NAD noted. 1:1 sitter continued.

## 2023-02-01 NOTE — DISCHARGE PLANNING
**Late Entry**    Legal Hold Transfer     Referral: Legal hold transfer to Mental Health Facility     Intervention: Notified by  Anjelica that pt has been accepted to [facility].     Pt's accepting physcian is Dr. Owens    Spoke to Celso at [facility]     Transport arranged through [facility]     The pt will be picked up at 1400      Notified Bedside RN Paolo and Alert Team CHRISTA Russo of the departure time as well as accepting facility.      Transfer packet and COBRA created with original legal hold and placed on chart by Alert Team RN.     Plan: Pt will be transferring to [facility] today at 1400 via [facility].

## 2023-02-01 NOTE — ED NOTES
[facility] arrived to transfer patient to [facility]. Patient taken via EMS with all patient belongings. Legal paperwork with EMS

## 2023-02-01 NOTE — ED NOTES
Pt resting, airway patent, rr even and unlabored, equal chest rise and fall. NAD noted. Pt eating dinner. 1:1 sitter continued.

## 2023-02-01 NOTE — DISCHARGE PLANNING
Annabel at [facility] called to accept pt. MD is Dr. Owens. Facility requests transportation to be arranged for 1400. Gini notified and will make transportation arrangements.

## 2023-02-01 NOTE — ED NOTES
Pt sleeping in bed, equal chest rise and fall, airway patent, rr even and unlabored, NAD noted. 1:1 sitter continued

## 2023-02-01 NOTE — ED NOTES
Med rec completed per patient at bedside.  Allergies reviewed with patient.  Patient's preferred pharmacy: [facility] on [address]     Patient was prescribed a 3 day course of Bactrim DS on 1/30/2023, however she states she has not started this antibiotic. Patient denies using any prescription medications at home and states no other outpatient antibiotic usage within the last 30 days.  No recent over-the-counter medications.

## 2023-02-01 NOTE — CONSULTS
"[facility]   TRIAGE ASSESSMENT    Name: Abel Angulo  MRN: [id number]  : 1990  Age: 32 y.o.  Date of assessment: 2023  PCP: Pcp Pt States None  Persons in attendance: Patient  Patient Location: [facility]    CHIEF COMPLAINT/PRESENTING ISSUE (as stated by patient): Pt BIB EMS for medical clearance due to legal hold initiated by [facility] outpatient clinic for stating \"I just want to die.\" Pt reports SI with plan to OD on methamphetamine and fentanyl. 5 ED presentations in the past 2 days: 23 [place] x2, 23 [facility] x1 and [place] x2 for c/o paranoia. EMR documents history psychosis and methamphetamine use disorder. UDS positive for methamphetamine. Pt is unable to contract for safety at this time. Continue legal hold, refer to inpatient psychiatric facilities.   Chief Complaint   Patient presents with    Suicidal Ideation     Pt was sent over by ambulance from [facility] for SI. Pt reports that she just feels like she wants to die. Pt denies a plan at this time. Pt has had a previous attempt in the past by trying to drowned herself. Pt denies HI at this time.     Exposure to STD     Pt reports she's having dysuria and urgency. Pt states that she may have been exposed to an STD and would like to be checked.         CURRENT LIVING SITUATION/SOCIAL SUPPORT/FINANCIAL RESOURCES: Chronic homelessness, lacks social supports.     BEHAVIORAL HEALTH/SUBSTANCE USE TREATMENT HISTORY  Does patient/parent report a history of prior behavioral health/substance use treatment for patient?   Dates Level of Care Facilty/Provider Diagnosis/Problem Medications   10/25/22 Inpt [facility] Schizoaffective d/o Prozac, Prazosin, Risperdal, depakote, melatonin   2018 inpt [facility] SI, Depression, Bipolar D/O  Lithium 600 mg PO BID, Seroquel 25 mg PO daily, and Lamictal 1 PO PRN, last doses 2018 [facility] x 30 days Methamphetamine " dependence     2017 inpt [facility] SI, Depression, Bipolar D/O     2018 outpt [facility] Depression, Bipolar D/O            SAFETY ASSESSMENT - SELF  Does patient acknowledge current or past symptoms of dangerousness to self or is previous history noted? no  Does parent/significant other report patient has current or past symptoms of dangerousness to self? N\A  Does presenting problem suggest symptoms of dangerousness to self? Yes:     Past Current    Suicidal Thoughts: [x]  [x]    Suicidal Plans: [x]  [x]    Suicidal Intent: []  []    Suicide Attempts: [x]  []    Self-Injury []  []      For any boxes checked above, provide detail: Pt told [place] [facility] outpatient that she wanted to die. States she wants to overdose on methamphetamine and fentanyl.     History of suicide by family member: no  History of suicide by friend/significant other: no  Recent change in frequency/specificity/intensity of suicidal thoughts or self-harm behavior? yes  Current access to firearms, medications, or other identified means of suicide/self-harm? no  If yes, willing to restrict access to means of suicide/self-harm? yes  Protective factors present:      SAFETY ASSESSMENT - OTHERS  Does patient acknowledge current or past symptoms of aggressive behavior or risk to others or is previous history noted? no  Does parent/significant other report patient has current or past symptoms of aggressive behavior or risk to others?  N\A  Does presenting problem suggest symptoms of dangerousness to others? No    LEGAL HISTORY  Does patient acknowledge history of arrest/[facility]/longterm or is previous history noted? no    Crisis Safety Plan completed and copy given to patient? N\A    ABUSE/NEGLECT SCREENING  Does patient report feeling “unsafe” in his/her home, or afraid of anyone?  Yes, pt is feeling paranoid  Does patient report any history of physical, sexual, or emotional abuse?  no  Does parent or significant other report any of the above? N\A  Is  "there evidence of neglect by self?  yes  Is there evidence of neglect by a caregiver? no  Does the patient/parent report any history of CPS/APS/police involvement related to suspected abuse/neglect or domestic violence? no  Based on the information provided during the current assessment, is a mandated report of suspected abuse/neglect being made?  No    SUBSTANCE USE SCREENING  Yes:  Car all substances used in the past 30 days:      Last Use Amount   []   Alcohol     []   Marijuana     []   Heroin     []   Prescription Opioids  (used without prescription, for    recreation, or in excess of prescribed amount)     []   Other Prescription  (used without prescription, for    recreation, or in excess of prescribed amount)     []   Cocaine      [x]   Methamphetamine unknown    []   \"\" drugs (ectasy, MDMA)     []   Other substances        UDS results: positive for methamphetamines  Breathalyzer results: 0.0    What consequences does the patient associate with any of the above substance use and or addictive behaviors? Financial, health    MENTAL STATUS   Participation: No verbal participation  Grooming: Disheveled  Orientation: Drowsy/Somnolent  Behavior: Calm  Eye contact:  Indirect  Mood: Irritable  Affect: Congruent with content  Thought process: Perseveration  Thought content: Paranoia  Speech:  Limited verbal participation  Perception: Within normal limits  Memory:  Poor memory for chronology of events  Insight: Poor  Judgment:  Limited  Other:    Collateral information:    Source:  [] Significant other present in person:   [] Significant other by telephone  [] [facility]   [] [facility] Nursing Staff  [x] [facility]  [x] Other: [facility]'s visits    [] Unable to complete full assessment due to:  [] Acute intoxication  [x] Patient declined to participate/engage  [] Patient verbally unresponsive  [] Significant cognitive deficits  [] Significant perceptual distortions or behavioral " disorganization  [] Other:      CLINICAL IMPRESSIONS:  Primary:  methamphetamine use disorder  Secondary:  chronic homelessness, lack of social supports       IDENTIFIED NEEDS/PLAN:  [Trigger DISPOSITION list for any items marked]    []  Imminent safety risk - self [] Imminent safety risk - others   []  Acute substance withdrawal [x]  Psychosis/Impaired reality testing   []  Mood/anxiety [x]  Substance use/Addictive behavior   [x]  Maladaptive behaviro []  Parent/child conflict   []  Family/Couples conflict []  Biomedical   [x]  Housing [x]  Financial   []   Legal  Occupational/Educational   []  Domestic violence []  Other:     Recommended Plan of Care:  Continue legal hold while in the ED, 1:1 sitter. [facility] [place] [facility]   *Telesitter may not be utilized for moderate or high risk patients    Has the Recommended Plan of Care/Level of Observation been reviewed with the patient's assigned nurse? yes    Does patient/parent or guardian express agreement with the above plan? yes     Referral appointment(s) scheduled? N\A    Alert team only:   I have discussed findings and recommendations with Dr. Liu who is in agreement with these recommendations.     Referral information sent to the following outpatient community providers :    Referral information sent to the following inpatient community providers : [facility], [facility].    If applicable : Referred  to  Alert Team for legal hold follow up at (time): 2/3/23      Rafaela Granados R.N.  1/31/2023

## 2023-02-01 NOTE — ED NOTES
Pt attempting to swallow half a bactrim tablet this morning and had difficulty swallowing pill. Pill got stuck in throat pt was unable to swallow anything else. Pt immediately vomited.

## 2023-02-02 LAB — RPR SER QL: NON REACTIVE

## 2023-02-03 LAB — T PALLIDUM AB SER QL AGGL: REACTIVE

## 2023-02-06 LAB — SPECIMEN SOURCE: NORMAL

## 2023-02-24 ENCOUNTER — HOSPITAL ENCOUNTER (EMERGENCY)
Facility: MEDICAL CENTER | Age: 33
End: 2023-02-24
Attending: EMERGENCY MEDICINE
Payer: COMMERCIAL

## 2023-02-24 VITALS
HEART RATE: 56 BPM | TEMPERATURE: 98 F | RESPIRATION RATE: 14 BRPM | BODY MASS INDEX: 25.5 KG/M2 | WEIGHT: 143.96 LBS | DIASTOLIC BLOOD PRESSURE: 74 MMHG | OXYGEN SATURATION: 97 % | SYSTOLIC BLOOD PRESSURE: 116 MMHG

## 2023-02-24 DIAGNOSIS — R45.851 SUICIDAL IDEATION: ICD-10-CM

## 2023-02-24 DIAGNOSIS — R11.0 CHRONIC NAUSEA: ICD-10-CM

## 2023-02-24 DIAGNOSIS — Z00.8 MEDICAL CLEARANCE FOR PSYCHIATRIC ADMISSION: ICD-10-CM

## 2023-02-24 LAB
ALBUMIN SERPL BCP-MCNC: 4.6 G/DL (ref 3.2–4.9)
ALBUMIN/GLOB SERPL: 1.4 G/DL
ALP SERPL-CCNC: 47 U/L (ref 30–99)
ALT SERPL-CCNC: 10 U/L (ref 2–50)
ANION GAP SERPL CALC-SCNC: 11 MMOL/L (ref 7–16)
AST SERPL-CCNC: 13 U/L (ref 12–45)
BASOPHILS # BLD AUTO: 0.5 % (ref 0–1.8)
BASOPHILS # BLD: 0.03 K/UL (ref 0–0.12)
BILIRUB SERPL-MCNC: 0.6 MG/DL (ref 0.1–1.5)
BUN SERPL-MCNC: 8 MG/DL (ref 8–22)
CALCIUM ALBUM COR SERPL-MCNC: 9 MG/DL (ref 8.5–10.5)
CALCIUM SERPL-MCNC: 9.5 MG/DL (ref 8.5–10.5)
CHLORIDE SERPL-SCNC: 104 MMOL/L (ref 96–112)
CO2 SERPL-SCNC: 25 MMOL/L (ref 20–33)
CREAT SERPL-MCNC: 0.79 MG/DL (ref 0.5–1.4)
EOSINOPHIL # BLD AUTO: 0.26 K/UL (ref 0–0.51)
EOSINOPHIL NFR BLD: 4.4 % (ref 0–6.9)
ERYTHROCYTE [DISTWIDTH] IN BLOOD BY AUTOMATED COUNT: 46.6 FL (ref 35.9–50)
GFR SERPLBLD CREATININE-BSD FMLA CKD-EPI: 101 ML/MIN/1.73 M 2
GLOBULIN SER CALC-MCNC: 3.4 G/DL (ref 1.9–3.5)
GLUCOSE SERPL-MCNC: 95 MG/DL (ref 65–99)
HCG SERPL QL: NEGATIVE
HCT VFR BLD AUTO: 39.9 % (ref 37–47)
HGB BLD-MCNC: 12.9 G/DL (ref 12–16)
IMM GRANULOCYTES # BLD AUTO: 0.01 K/UL (ref 0–0.11)
IMM GRANULOCYTES NFR BLD AUTO: 0.2 % (ref 0–0.9)
LIPASE SERPL-CCNC: 29 U/L (ref 11–82)
LYMPHOCYTES # BLD AUTO: 1.56 K/UL (ref 1–4.8)
LYMPHOCYTES NFR BLD: 26.5 % (ref 22–41)
MCH RBC QN AUTO: 28.4 PG (ref 27–33)
MCHC RBC AUTO-ENTMCNC: 32.3 G/DL (ref 33.6–35)
MCV RBC AUTO: 87.7 FL (ref 81.4–97.8)
MONOCYTES # BLD AUTO: 0.28 K/UL (ref 0–0.85)
MONOCYTES NFR BLD AUTO: 4.8 % (ref 0–13.4)
NEUTROPHILS # BLD AUTO: 3.75 K/UL (ref 2–7.15)
NEUTROPHILS NFR BLD: 63.6 % (ref 44–72)
NRBC # BLD AUTO: 0 K/UL
NRBC BLD-RTO: 0 /100 WBC
PLATELET # BLD AUTO: 188 K/UL (ref 164–446)
PMV BLD AUTO: 9 FL (ref 9–12.9)
POC BREATHALIZER: 0 PERCENT (ref 0–0.01)
POTASSIUM SERPL-SCNC: 3.8 MMOL/L (ref 3.6–5.5)
PROT SERPL-MCNC: 8 G/DL (ref 6–8.2)
RBC # BLD AUTO: 4.55 M/UL (ref 4.2–5.4)
SODIUM SERPL-SCNC: 140 MMOL/L (ref 135–145)
WBC # BLD AUTO: 5.9 K/UL (ref 4.8–10.8)

## 2023-02-24 PROCEDURE — 96374 THER/PROPH/DIAG INJ IV PUSH: CPT

## 2023-02-24 PROCEDURE — 85025 COMPLETE CBC W/AUTO DIFF WBC: CPT

## 2023-02-24 PROCEDURE — 302970 POC BREATHALIZER: Performed by: EMERGENCY MEDICINE

## 2023-02-24 PROCEDURE — 700102 HCHG RX REV CODE 250 W/ 637 OVERRIDE(OP): Performed by: EMERGENCY MEDICINE

## 2023-02-24 PROCEDURE — 99285 EMERGENCY DEPT VISIT HI MDM: CPT

## 2023-02-24 PROCEDURE — 84703 CHORIONIC GONADOTROPIN ASSAY: CPT

## 2023-02-24 PROCEDURE — 700105 HCHG RX REV CODE 258: Performed by: EMERGENCY MEDICINE

## 2023-02-24 PROCEDURE — 90791 PSYCH DIAGNOSTIC EVALUATION: CPT

## 2023-02-24 PROCEDURE — A9270 NON-COVERED ITEM OR SERVICE: HCPCS | Performed by: EMERGENCY MEDICINE

## 2023-02-24 PROCEDURE — 36415 COLL VENOUS BLD VENIPUNCTURE: CPT

## 2023-02-24 PROCEDURE — 700111 HCHG RX REV CODE 636 W/ 250 OVERRIDE (IP): Performed by: EMERGENCY MEDICINE

## 2023-02-24 PROCEDURE — 83690 ASSAY OF LIPASE: CPT

## 2023-02-24 PROCEDURE — 80053 COMPREHEN METABOLIC PANEL: CPT

## 2023-02-24 RX ORDER — LORAZEPAM 1 MG/1
1 TABLET ORAL EVERY 6 HOURS PRN
Status: DISCONTINUED | OUTPATIENT
Start: 2023-02-24 | End: 2023-02-25 | Stop reason: HOSPADM

## 2023-02-24 RX ORDER — ONDANSETRON 2 MG/ML
4 INJECTION INTRAMUSCULAR; INTRAVENOUS ONCE
Status: COMPLETED | OUTPATIENT
Start: 2023-02-24 | End: 2023-02-24

## 2023-02-24 RX ORDER — SODIUM CHLORIDE, SODIUM LACTATE, POTASSIUM CHLORIDE, CALCIUM CHLORIDE 600; 310; 30; 20 MG/100ML; MG/100ML; MG/100ML; MG/100ML
1000 INJECTION, SOLUTION INTRAVENOUS ONCE
Status: COMPLETED | OUTPATIENT
Start: 2023-02-24 | End: 2023-02-24

## 2023-02-24 RX ADMIN — SODIUM CHLORIDE, POTASSIUM CHLORIDE, SODIUM LACTATE AND CALCIUM CHLORIDE 1000 ML: 600; 310; 30; 20 INJECTION, SOLUTION INTRAVENOUS at 18:12

## 2023-02-24 RX ADMIN — LORAZEPAM 1 MG: 1 TABLET ORAL at 19:49

## 2023-02-24 RX ADMIN — ONDANSETRON 4 MG: 2 INJECTION INTRAMUSCULAR; INTRAVENOUS at 18:12

## 2023-02-24 ASSESSMENT — FIBROSIS 4 INDEX: FIB4 SCORE: 0.9

## 2023-02-25 NOTE — CONSULTS
"  Name: Abel Angulo  MRN: [id number]  : 1990  Age: 32 y.o.  Date of assessment: 2023  PCP: Pcp Pt States None  Persons in attendance: Patient  Patient Location: [facility]    CHIEF COMPLAINT/PRESENTING ISSUE (as stated by pt):   Chief Complaint   Patient presents with    Nausea     X2 days, no vomiting    Headache     X2 days    PT BIB self initially for nausea , however, reported in triage that she feels depressed and wants to die. PT is difficult to assess as she speaks quickly and provides vague answers.PT reports ongoing SI to writer, no plan but intent. Pt reports that she \"always\" experiences SI. PT was last evaluated for SI at [facility] on 2023, pt was accepted to [facility] and her accepting doctor was Dr Owens. Pt reports that she was at [facility] \"for about a week\" and then Dced to [facility] for follow-up services. Pt reports that she did not attend her follow-up appointments or  her MI medications. PT reports she has been \"living on the streets since MI. She reports one previous SA of \"trying to drown myself in a bathtub\" many years ago. PT reports that she is diagnosed with anxiety, schizoaffective DO, and bipolar. PT is unsure of her medications only reporting that she was prescribed Seroquel and Invega while at [facility]. PT denies any AH/VH. EMR documents history psychosis and methamphetamine use disorder. Pt reports that her last use of methamphetamine was yesterday. Pt is unable to contract for safety at this time. Continue legal hold, refer to inpatient psychiatric facilities.     CURRENT LIVING SITUATION/SOCIAL SUPPORT/FINANCIAL RESOURCES: PT reports that she is homeless \"sleeping anywhere\" and reports that she only receives food stamps. Lacks social support.    BEHAVIORAL HEALTH/SUBSTANCE USE TREATMENT HISTORY  Does patient/parent report a history of prior behavioral health/substance use treatment for patient?   Yes:    Dates Level of Care Facilty/Provider Diagnosis/Problem " "Medications   02/023 Inpt [facility] SI  Invega, Seroquel   10/25/22 Inpt [facility] Schizoaffective d/o Prozac, Prazosin, Risperdal, depakote, melatonin   2018 inpt [facility] SI, Depression, Bipolar D/O  Lithium 600 mg PO BID, Seroquel 25 mg PO daily, and Lamictal 1 PO PRN, last doses 12/2018 [facility] Methamphetamine dependence     2017 inpt [facility] SI, Depression, Bipolar D/O     2018 outpt  [facility] Depression, Bipolar D/O            SAFETY ASSESSMENT - SELF  Does patient acknowledge current or past symptoms of dangerousness to self or is previous history noted? yes  Does parent/significant other report patient has current or past symptoms of dangerousness to self? N\A  Does presenting problem suggest symptoms of dangerousness to self? Yes - PT reports ongoing SI to writer, no plan but intent. Pt reports that she \"always\" experiences SI.    Past Current    Suicidal Thoughts: [x]  [x]    Suicidal Plans: [x]  []    Suicidal Intent: [x]  [x]    Suicide Attempts: [x]  []    Self-Injury [x]  []           History of suicide by family member: no  History of suicide by friend/significant other: no  Recent change in frequency/specificity/intensity of suicidal thoughts or self-harm behavior? no  Current access to firearms, medications, or other identified means of suicide/self-harm? no  If yes, willing to restrict access to means of suicide/self-harm? no  Protective factors present:  Willing to address in treatment    SAFETY ASSESSMENT - OTHERS  Does patient acknowledge current or past symptoms of aggressive behavior or risk to others or is previous history noted? no  Does parent/significant other report patient has current or past symptoms of aggressive behavior or risk to others?  no  Does presenting problem suggest symptoms of dangerousness to others? No    LEGAL HISTORY  Does patient acknowledge history of arrest/[facility]/custodial or is previous history noted? " "no    Crisis Safety Plan completed and copy given to patient? no    ABUSE/NEGLECT SCREENING  Does patient report feeling “unsafe” in his/her home, or afraid of anyone?  no  Does patient report any history of physical, sexual, or emotional abuse?  no  Does parent or significant other report any of the above? no  Is there evidence of neglect by self?  no  Is there evidence of neglect by a caregiver? no  Does the patient/parent report any history of CPS/APS/police involvement related to suspected abuse/neglect or domestic violence? no  Based on the information provided during the current assessment, is a mandated report of suspected abuse/neglect being made?  No    SUBSTANCE USE SCREENING  Yes:  Car all substances used in the past 30 days:      Last Use Amount   []   Alcohol     []   Marijuana     []   Heroin     []   Prescription Opioids  (used without prescription, for    recreation, or in excess of prescribed amount)     []   Other Prescription  (used without prescription, for    recreation, or in excess of prescribed amount)     []   Cocaine \"Yesterday\"  \"I don't know.\" Pt reports using meth daily for years.   [x]   Methamphetamine     []   \"\" drugs (ectasy, MDMA)     []   Other substances        UDS results: Pending  Breathalyzer results: 0.00    What consequences does the patient associate with any of the above substance use and or addictive behaviors? Legal: Health problems          MENTAL STATUS   Participation: Active verbal participation and Guarded  Grooming: Disheveled  Orientation: Alert  Behavior: Calm  Eye contact: Limited  Mood: Depressed  Affect: Blunted  Thought process: Logical and Goal-directed  Thought content: Within normal limits  Speech: Soft and Hypotalkative  Perception: Within normal limits  Memory:  Recent:  Adequate  Insight: Limited  Judgment:  Limited  Other:    Collateral information:   Source:  [] Significant other present in person:   [] Significant other by telephone  [] " [facility]   [] [facility] Nursing Staff  [] [facility]  [] Other:     [] Unable to complete full assessment due to:  [] Acute intoxication  [] Patient declined to participate/engage  [] Patient verbally unresponsive  [] Significant cognitive deficits  [] Significant perceptual distortions or behavioral disorganization  [] Other:      CLINICAL IMPRESSIONS:  Primary:  SI  Secondary:  Depression       IDENTIFIED NEEDS/PLAN:  [Trigger DISPOSITION list for any items marked]    []  Imminent safety risk - self [] Imminent safety risk - others   []  Acute substance withdrawal []  Psychosis/Impaired reality testing   []  Mood/anxiety []  Substance use/Addictive behavior   []  Maladaptive behaviro []  Parent/child conflict   []  Family/Couples conflict []  Biomedical   []  Housing []  Financial   []   Legal  Occupational/Educational   []  Domestic violence []  Other:     Recommended Plan of Care:  Continue legal hold while in the ED, 1:1 sitter. [facility] [place] [facility]  *Telesitter may not be utilized for moderate or high risk patients    Has the Recommended Plan of Care/Level of Observation been reviewed with the patient's assigned nurse? yes    Does patient/parent or guardian express agreement with the above plan? yes    Referral appointment(s) scheduled? N\A    Alert team only:   I have discussed findings and recommendations with Dr. Berman who is in agreement with these recommendations.     Referral information sent to the following community providers : [facility], [facility]     If applicable : Referred  to  for legal hold follow up at (time): 02/24/2023. 1845      Robbie Santoro R.N.

## 2023-02-25 NOTE — ED TRIAGE NOTES
Ambulates to triage  Chief Complaint   Patient presents with    Nausea     X2 days, no vomiting    Headache     X2 days     Pt also said she has been feeling depressed and wants to die. Pt has no plan, and has never tried to hurt herself in the past. Pt is living on the streets currently.

## 2023-02-25 NOTE — ED NOTES
"Pt states she does not feel safe at home. \"I just don't feel right\". Pt states thoughts of suicide \"because I don't want people to come after me.\" Pt unable to elaborate on who she feels is coming after her. Pt states vague thoughts of suicide as well as nausea and pain that have been going on \"for a while now\".  "

## 2023-02-25 NOTE — ED PROVIDER NOTES
"ED Provider Note    CHIEF COMPLAINT  Chief Complaint   Patient presents with    Nausea     X2 days, no vomiting    Headache     X2 days       EXTERNAL RECORDS REVIEWED  Outpatient Notes recent ER notes she was seen for dysuria, also screen for chlamydia and gonorrhea which was negative she was subsequent transferred to inpatient psychiatry    HPI/ROS  LIMITATION TO HISTORY   Select: : None      Abel Ifrah Angulo is a 32 y.o. female who presents with complaints of nausea, and feeling like she is sad and does not want to live anymore.  Both of these symptoms she has had for many years.  The nausea, she states that she has had for very long time.  Feels whenever she tries to eat or drink anything she is poisoning herself.  She denies any associated pain.  Denies any change in bowel or bladder.  Previously been seen for dysuria, that is not there anymore.  No vaginal symptoms.  Does not think that she is pregnant.  There is been no fever.  With regards to her mental health, she states has been feeling suicidal for \"years.\"  It is not clear what is different presently.  It does sound like she was recently staying with a friend, but that arrangement is no longer an option, and she is living on the streets.  I do note in the chart has been positive for amphetamines in the past.  She denies this presently.  There is no other complaint.    PAST MEDICAL HISTORY   has a past medical history of Acid reflux, Bipolar 1 disorder (HCC), Dental disorder, Depression, Migraine, Migraines, and Psychiatric problem.    SURGICAL HISTORY   has a past surgical history that includes other; esophagoscopy (4/5/2012); and gyn surgery.    FAMILY HISTORY  Family History   Family history unknown: Yes       SOCIAL HISTORY  Social History     Tobacco Use    Smoking status: Former     Types: Cigarettes    Smokeless tobacco: Never   Vaping Use    Vaping Use: Never used   Substance and Sexual Activity    Alcohol use: Not Currently     Comment: " "quit October 2 2013    Drug use: Not Currently     Types: Intravenous, Inhaled     Comment: Marijuana, meth    Sexual activity: Never     Partners: Male     Birth control/protection: Surgical       CURRENT MEDICATIONS  Home Medications       Reviewed by Kaylene Rothman R.N. (Registered Nurse) on 02/24/23 at 1651  Med List Status: Partial     Medication Last Dose Status   QUEtiapine Fumarate (SEROQUEL PO)  Active                    ALLERGIES  Allergies   Allergen Reactions    Advil [Ibuprofen] Swelling     Patient reports \"tongue swelling\"    Diphenhydramine Swelling     Patient reports \"tongue swelling\"       PHYSICAL EXAM  VITAL SIGNS: BP (!) 129/94   Pulse (!) 102   Temp 36.3 °C (97.3 °F) (Temporal)   Resp 16   Wt 65.3 kg (143 lb 15.4 oz)   SpO2 97%   BMI 25.50 kg/m²    Constitutional: Well appearing patient in no acute distress.  HENT: Head is without trauma.  Oropharynx is clear.  Mucous membranes are moist.  Eyes: Sclerae are nonicteric, pupils are equally round.  Neck: Supple with grossly normal range of motion.  Cardiovascular: Heart is regular rate and rhythm without murmur rub or gallop.  Peripheral pulses are intact and symmetric throughout.  Thorax & Lungs: Breathing easily.  Good air movement.  There is no wheeze, rhonchi or rales.  Abdomen: Bowel sounds normal, soft, non-distended, nontender, no mass nor pulsatile mass. I do not appreciate hepatosplenomegaly.  Skin: No apparent rash.  I do not see petechiae or purpura.  Extremities: No evidence of acute trauma.  No clubbing, cyanosis, edema, no Homans or cords.  Neurologic: Alert. Moving all extremities.  Intact sensation and strength throughout.  Gait is normal.  Psychiatric: Seems somewhat anxious, speech is mildly pressured.      DIAGNOSTIC STUDIES / PROCEDURES    LABS  Labs Reviewed   CBC WITH DIFFERENTIAL - Abnormal; Notable for the following components:       Result Value    MCHC 32.3 (*)     All other components within normal limits "   POC BREATHALIZER - Normal   COMP METABOLIC PANEL   LIPASE   HCG QUAL SERUM   CORRECTED CALCIUM   ESTIMATED GFR        UDS is not yet been collected.        COURSE & MEDICAL DECISION MAKING    ED Observation Status? Yes; I am placing the patient in to an observation status due to a diagnostic uncertainty as well as therapeutic intensity. Patient placed in observation status at 5:34 PM, 2/24/2023.     Observation plan is as follows: We will check laboratories, give her antiemetics, IV fluids, have mental health see her.      INITIAL ASSESSMENT, COURSE AND PLAN  Care Narrative: Patient presents with a few different issues.  She has been having some longstanding nausea.  Her belly is absolutely benign.  However, we will go ahead and check laboratories on her, give her IV fluids.  With respect to her suicidal ideology, it sounds like this has been a longstanding issue for her as well.  Nothing is changed, she has no plan.  However, we will have mental health evaluate her.        ADDITIONAL PROBLEM LIST  With respect to her nausea.  The patient was given some Zofran, IV fluids.  She feels pretty much unchanged.  Her laboratories are unrevealing.  Her GFR is normal, going along with her normal hydration status clinically.  Also, renal function is normal in terms of BUN and creatinine.  Basic chemistries are normal.  Liver function tests are normal.  Lipase is normal.  There is no evidence of pancreatitis or hepatitis.  She is not pregnant.  There is no leukocytosis nor shift.  She has no symptoms to suggest urinary tract infection.  No symptoms to suggest PID.  Upon recheck at 1900, she continues to have a benign abdomen.  At this point do not think that further work-up is indicated with respect to this.  Specifically do not think that further imaging is required nor further blood work.    With respect to her mental health, I did discuss the patient's case with the life skills nurse.  He agrees that the patient is  certainly concerning with her ongoing fixation on suicide.  Although she does not have a plan at this time, she certainly is repeatedly expressing intent.  To me she describes a acute issue resulting in her being homeless and my concern was that there is might be a precipitating stressor.  However to the nurse, she describes chronic homelessness.  Regardless, at this time we agree the patient is a risk for self-harm and therefore was put onto an involuntary hold.  The patient for her part does not disagree with this.    At this time the plan is transfer to inpatient psychiatric hospital for stabilization or further recommendations from consulting psychiatry should they be able to see her.    Presently, to a reasonable degree, the patient is medically cleared for inpatient psychiatric care and stabilization.      FINAL DIAGNOSIS  1. Suicidal ideation    2. Chronic nausea    3. Medical clearance for psychiatric admission           Electronically signed by: Ildefonso Berman M.D., 2/24/2023 5:31 PM

## 2023-02-25 NOTE — DISCHARGE PLANNING
Alert Team:    Pt has been accepted by Dr. Owens at [facility]; requesting transport at 0100 to be arranged via [facility] by this writer. Updated RN and ERP.

## 2023-02-25 NOTE — ED NOTES
Unable to obtain med rec at this time  Patient unable to participate in interview  Home Pharmacy currently closed

## 2023-02-25 NOTE — DISCHARGE PLANNING
[facility] ED Behavioral Health Fax Referral      [facility] ED Behavioral Health Alert Team:  [phone]    Referral: Legal Hold    Intervention: Patient referral to [facility] inpatient  facillity    Legal Hold Initiated: Date: 02/24/2023 Time: 1845    Patient’s Insurance Listed on Face Sheet: Artie    Referrals sent to: [facility], [facility], [facility]    Referrals faxed by Robbie Santoro RN, KEMAR    This referral contains the following information:  Face sheet _x___  Page 1 and Page 2 of Legal Hold ___x_  Alert Team Assessment/Psych Assessment _x___  Head to toe physical exam x____  Urine Drug Screen __pending__  Blood Alcohol __x__  Vital signs __x__  Pregnancy test when applicable _x__  Medications list ____  Covid screening ____    Plan: Patient will transfer to mental health facility once acceptance is obtained

## 2023-02-25 NOTE — ED NOTES
"When starting an IV, small wounds noted on Pt's arms. When asked what they were from, Pt stated \"From me using, but it's been a long time. I don't use anymore.\" When asked what and when the last time Pt used drugs, Pt stated \"I shot up with some meth yesterday.\"  "

## 2023-04-17 ENCOUNTER — HOSPITAL ENCOUNTER (EMERGENCY)
Facility: MEDICAL CENTER | Age: 33
End: 2023-04-18
Attending: EMERGENCY MEDICINE
Payer: COMMERCIAL

## 2023-04-17 DIAGNOSIS — R45.851 SUICIDAL IDEATION: ICD-10-CM

## 2023-04-17 DIAGNOSIS — Z86.59 HISTORY OF BIPOLAR DISORDER: ICD-10-CM

## 2023-04-17 DIAGNOSIS — F43.21 SITUATIONAL DEPRESSION: ICD-10-CM

## 2023-04-17 LAB — POC BREATHALIZER: 0 PERCENT (ref 0–0.01)

## 2023-04-17 PROCEDURE — 302970 POC BREATHALIZER: Performed by: EMERGENCY MEDICINE

## 2023-04-17 PROCEDURE — 99285 EMERGENCY DEPT VISIT HI MDM: CPT

## 2023-04-17 ASSESSMENT — FIBROSIS 4 INDEX: FIB4 SCORE: 0.72

## 2023-04-18 VITALS
HEART RATE: 87 BPM | RESPIRATION RATE: 16 BRPM | TEMPERATURE: 97.6 F | BODY MASS INDEX: 24.53 KG/M2 | WEIGHT: 138.45 LBS | DIASTOLIC BLOOD PRESSURE: 74 MMHG | HEIGHT: 63 IN | SYSTOLIC BLOOD PRESSURE: 111 MMHG | OXYGEN SATURATION: 96 %

## 2023-04-18 PROCEDURE — 90791 PSYCH DIAGNOSTIC EVALUATION: CPT

## 2023-04-18 NOTE — DISCHARGE SUMMARY
"  ED Observation Discharge Summary    Patient:Abel Angulo  Patient : 1990  Patient MRN: [id number]  Patient PCP: Pcp Pt States None    Admit Date: 2023  Discharge Date and Time: 23 8:17 AM  Discharge Attending: Annabel Sparrow D.O.  Discharge Service: ED Observation    ED Course  Abel is a 32 y.o. female who was evaluated at [facility] for psychiatric evaluation and what sounds like passive suicidal ideations.  Patient had no plan.  Care signed out to me from nighttime ERP pending evaluation by the alert team.    8:06 AM discussed with the alert team.  They have been able to evaluate the patient.  She has been noncompliant with her outpatient therapies she has no active suicidal ideations at this time and does not meet criteria for legal hold.  She is given resources including MTM resources for transportation.    8:17 AM patient's reevaluated at the bedside.  She is resting and appears comfortable.  She had a sandwich and juice.  She denies any suicidal ideations at this time and contracts for safety.  She does admit that she has been noncompliant with her outpatient visits at [facility].  With the help of MTM which was explained by the alert team, this will assist her in getting transportation there.  At this point, I feel she can safely be discharged home.    Discharge Exam:  /68   Pulse 88   Temp 36.4 °C (97.6 °F) (Temporal)   Resp 16   Ht 1.6 m (5' 3\")   Wt 62.8 kg (138 lb 7.2 oz)   SpO2 96%   BMI 24.53 kg/m² .    Constitutional: Awake and alert. Nontoxic  HENT:  Grossly normal  Eyes: Grossly normal  Neck: Normal range of motion  Cardiovascular: Normal heart rate   Thorax & Lungs: No respiratory distress  Abdomen: Nontender  Skin:  No pathologic rash.   Extremities: Well perfused  Psychiatric: Affect normal    Labs  Results for orders placed or performed during the hospital encounter of 23   POC BREATHALIZER   Result Value Ref " Range    POC Breathalizer 0.000 0.00 - 0.01 Percent     My final assessment includes   1. Suicidal ideation      passive, resolved      2. Situational depression        3. History of bipolar disorder            Upon Reevaluation, the patient's condition has: Improved; and will be discharged.    Patient discharged from ED Observation status at 8:20 AM (Time) 18, 2023 (Date).     Total time spent on this ED Observation discharge encounter is < 30 Minutes    Electronically signed by: Annabel Sparrow D.O., 4/18/2023 8:17 AM

## 2023-04-18 NOTE — ED NOTES
Pt resting comfortably, respirations are regular and unlabored. Telesitter still at bedside to observe
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
## 2023-04-18 NOTE — ED NOTES
Pt still asleep resting comfortably, respirations are regular and unlabored. Provided pt with an additional warm blanket for comfort.

## 2023-04-18 NOTE — ED NOTES
Pt still asleep, respirations are regular and unlabored. Telesitter present at bedside to observe.
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
## 2023-04-18 NOTE — ED NOTES
Discharge instructions reviewed with patient. Pt verbalizes understanding. MTM instructions provided to patient so patient is able to get transportation. Pt instructed to utilize phones in lobby to call. Pt verbalized understanding for need to  prescriptions from [facility]. Pt ambulatory from ED with steady gait.

## 2023-04-18 NOTE — ED NOTES
Telesitter initiated for patient safety and elopement risk. Pt educated about telesitter and states she will be compliant with close observation procedures

## 2023-04-18 NOTE — ED NOTES
Med rec updated and complete. Allergies reviewed. Confirmed name and date of birth. Pt denies taking medications.    Home pharmacy [facility] [phone]